# Patient Record
Sex: FEMALE | Race: WHITE | NOT HISPANIC OR LATINO | Employment: FULL TIME
[De-identification: names, ages, dates, MRNs, and addresses within clinical notes are randomized per-mention and may not be internally consistent; named-entity substitution may affect disease eponyms.]

---

## 2018-04-15 ENCOUNTER — HEALTH MAINTENANCE LETTER (OUTPATIENT)
Age: 43
End: 2018-04-15

## 2019-10-14 ENCOUNTER — TELEPHONE (OUTPATIENT)
Dept: PEDIATRICS | Facility: CLINIC | Age: 44
End: 2019-10-14

## 2019-10-14 DIAGNOSIS — R19.7 DIARRHEA, UNSPECIFIED TYPE: Primary | ICD-10-CM

## 2019-10-14 NOTE — TELEPHONE ENCOUNTER
Email:      Thanks. Tests for Jacinto and Warren definitely. Can you give ashlie and I as well or should we get from adult provider?  kristopher    On Mon, Oct 14, 2019 at 11:37 AM Annette Osullivan <[email or web address]> wrote:  Thanks for reaching out.  There is a local outbreak with crypto, so I would recommend testing for it for whoever is having intermittent diarrhea/cramping.  Alternatively would be just to treat- which is 3 days of anti-parasite medicine and pretty effective.  I would recommend testing though before treating.  Let me know who needs it and I can order the stool tests for you to .  If positive I will treat with the antiparasite med.  I will also screen for the other virus/bacteria panel as well.    Rebecca     From: Kristopher Butler [[email or web address]   Sent: Monday, October 14, 2019 8:08 AM  To: Annette Osullivan  Subject: crypto?     Kiran Fernandez-I hope you guys are good. I happened to sit next to Christina last night at the dinner and he mentioned [facility] being closed due to crypto. Two weeks ago we had Jacinto's birthday party at the [facility] and since then we have had intermittent diarrhea and some crampy abdominal pain in our house. I looked online and didn't see anything about the [facility] and crypto but given they are close and may have some overlap I am wondering if you think we should get tested or just treat? Thanks.  kristopher

## 2019-11-25 DIAGNOSIS — R05.9 COUGH: Primary | ICD-10-CM

## 2019-11-25 RX ORDER — AZITHROMYCIN 250 MG/1
TABLET, FILM COATED ORAL
Qty: 6 TABLET | Refills: 0 | Status: SHIPPED | OUTPATIENT
Start: 2019-11-25 | End: 2022-01-19

## 2020-02-26 ENCOUNTER — ANCILLARY PROCEDURE (OUTPATIENT)
Dept: MAMMOGRAPHY | Facility: CLINIC | Age: 45
End: 2020-02-26
Attending: OBSTETRICS & GYNECOLOGY
Payer: COMMERCIAL

## 2020-02-26 DIAGNOSIS — Z12.31 VISIT FOR SCREENING MAMMOGRAM: ICD-10-CM

## 2020-03-10 ENCOUNTER — HEALTH MAINTENANCE LETTER (OUTPATIENT)
Age: 45
End: 2020-03-10

## 2020-12-20 ENCOUNTER — HEALTH MAINTENANCE LETTER (OUTPATIENT)
Age: 45
End: 2020-12-20

## 2021-01-27 ENCOUNTER — TRANSFERRED RECORDS (OUTPATIENT)
Dept: HEALTH INFORMATION MANAGEMENT | Facility: CLINIC | Age: 46
End: 2021-01-27
Payer: COMMERCIAL

## 2021-03-05 DIAGNOSIS — Z12.31 VISIT FOR SCREENING MAMMOGRAM: ICD-10-CM

## 2021-03-05 PROCEDURE — 77067 SCR MAMMO BI INCL CAD: CPT | Mod: GC

## 2021-03-05 PROCEDURE — 77063 BREAST TOMOSYNTHESIS BI: CPT | Mod: GC

## 2021-04-24 ENCOUNTER — HEALTH MAINTENANCE LETTER (OUTPATIENT)
Age: 46
End: 2021-04-24

## 2021-10-03 ENCOUNTER — HEALTH MAINTENANCE LETTER (OUTPATIENT)
Age: 46
End: 2021-10-03

## 2021-12-02 ENCOUNTER — IMMUNIZATION (OUTPATIENT)
Dept: PEDIATRICS | Facility: CLINIC | Age: 46
End: 2021-12-02
Payer: COMMERCIAL

## 2021-12-02 PROCEDURE — 90471 IMMUNIZATION ADMIN: CPT

## 2021-12-02 PROCEDURE — 90686 IIV4 VACC NO PRSV 0.5 ML IM: CPT

## 2022-01-14 ENCOUNTER — ANCILLARY PROCEDURE (OUTPATIENT)
Dept: ULTRASOUND IMAGING | Facility: CLINIC | Age: 47
End: 2022-01-14
Attending: NURSE PRACTITIONER
Payer: COMMERCIAL

## 2022-01-14 DIAGNOSIS — N85.2 ENLARGED UTERUS: ICD-10-CM

## 2022-01-14 PROCEDURE — 76830 TRANSVAGINAL US NON-OB: CPT | Mod: GC | Performed by: RADIOLOGY

## 2022-01-14 PROCEDURE — 76856 US EXAM PELVIC COMPLETE: CPT | Mod: GC | Performed by: RADIOLOGY

## 2022-01-16 ENCOUNTER — HOSPITAL ENCOUNTER (EMERGENCY)
Facility: CLINIC | Age: 47
Discharge: HOME | End: 2022-01-16
Attending: EMERGENCY MEDICINE | Admitting: EMERGENCY MEDICINE
Payer: COMMERCIAL

## 2022-01-16 ENCOUNTER — APPOINTMENT (OUTPATIENT)
Dept: CT IMAGING | Facility: CLINIC | Age: 47
End: 2022-01-16
Attending: EMERGENCY MEDICINE
Payer: COMMERCIAL

## 2022-01-16 VITALS
RESPIRATION RATE: 14 BRPM | DIASTOLIC BLOOD PRESSURE: 89 MMHG | BODY MASS INDEX: 29.88 KG/M2 | HEART RATE: 90 BPM | OXYGEN SATURATION: 99 % | TEMPERATURE: 98.3 F | SYSTOLIC BLOOD PRESSURE: 128 MMHG | WEIGHT: 175 LBS | HEIGHT: 64 IN

## 2022-01-16 DIAGNOSIS — N20.1 URETERAL STONE: Primary | ICD-10-CM

## 2022-01-16 DIAGNOSIS — N20.1 URETERAL STONE: ICD-10-CM

## 2022-01-16 DIAGNOSIS — Q62.11 HYDRONEPHROSIS WITH URETEROPELVIC JUNCTION (UPJ) OBSTRUCTION: ICD-10-CM

## 2022-01-16 LAB
ALBUMIN SERPL-MCNC: 4.1 G/DL (ref 3.4–5)
ALBUMIN UR-MCNC: 30 MG/DL
ALP SERPL-CCNC: 62 U/L (ref 40–150)
ALT SERPL W P-5'-P-CCNC: 18 U/L (ref 0–50)
ANION GAP SERPL CALCULATED.3IONS-SCNC: 9 MMOL/L (ref 3–14)
APPEARANCE UR: ABNORMAL
AST SERPL W P-5'-P-CCNC: 15 U/L (ref 0–45)
BASOPHILS # BLD AUTO: 0.1 10E3/UL (ref 0–0.2)
BASOPHILS NFR BLD AUTO: 0 %
BILIRUB SERPL-MCNC: 1 MG/DL (ref 0.2–1.3)
BILIRUB UR QL STRIP: NEGATIVE
BUN SERPL-MCNC: 20 MG/DL (ref 7–30)
CALCIUM SERPL-MCNC: 9.2 MG/DL (ref 8.5–10.1)
CHLORIDE BLD-SCNC: 109 MMOL/L (ref 94–109)
CO2 SERPL-SCNC: 21 MMOL/L (ref 20–32)
COLOR UR AUTO: YELLOW
CREAT SERPL-MCNC: 1.12 MG/DL (ref 0.52–1.04)
EOSINOPHIL # BLD AUTO: 0 10E3/UL (ref 0–0.7)
EOSINOPHIL NFR BLD AUTO: 0 %
ERYTHROCYTE [DISTWIDTH] IN BLOOD BY AUTOMATED COUNT: 12.7 % (ref 10–15)
GFR SERPL CREATININE-BSD FRML MDRD: 61 ML/MIN/1.73M2
GLUCOSE BLD-MCNC: 116 MG/DL (ref 70–99)
GLUCOSE UR STRIP-MCNC: NEGATIVE MG/DL
HCT VFR BLD AUTO: 43.2 % (ref 35–47)
HGB BLD-MCNC: 14.6 G/DL (ref 11.7–15.7)
HGB UR QL STRIP: ABNORMAL
HOLD SPECIMEN: NORMAL
IMM GRANULOCYTES # BLD: 0 10E3/UL
IMM GRANULOCYTES NFR BLD: 0 %
KETONES UR STRIP-MCNC: 20 MG/DL
LEUKOCYTE ESTERASE UR QL STRIP: ABNORMAL
LIPASE SERPL-CCNC: 118 U/L (ref 73–393)
LYMPHOCYTES # BLD AUTO: 1.1 10E3/UL (ref 0.8–5.3)
LYMPHOCYTES NFR BLD AUTO: 10 %
MCH RBC QN AUTO: 29.1 PG (ref 26.5–33)
MCHC RBC AUTO-ENTMCNC: 33.8 G/DL (ref 31.5–36.5)
MCV RBC AUTO: 86 FL (ref 78–100)
MONOCYTES # BLD AUTO: 0.6 10E3/UL (ref 0–1.3)
MONOCYTES NFR BLD AUTO: 5 %
MUCOUS THREADS #/AREA URNS LPF: PRESENT /LPF
NEUTROPHILS # BLD AUTO: 9.7 10E3/UL (ref 1.6–8.3)
NEUTROPHILS NFR BLD AUTO: 85 %
NITRATE UR QL: NEGATIVE
NRBC # BLD AUTO: 0 10E3/UL
NRBC BLD AUTO-RTO: 0 /100
PH UR STRIP: 5.5 [PH] (ref 5–7)
PLATELET # BLD AUTO: 285 10E3/UL (ref 150–450)
POTASSIUM BLD-SCNC: 3.9 MMOL/L (ref 3.4–5.3)
PROT SERPL-MCNC: 7.6 G/DL (ref 6.8–8.8)
RADIOLOGIST FLAGS: ABNORMAL
RBC # BLD AUTO: 5.01 10E6/UL (ref 3.8–5.2)
RBC URINE: >182 /HPF
SARS-COV-2 RNA RESP QL NAA+PROBE: NEGATIVE
SODIUM SERPL-SCNC: 139 MMOL/L (ref 133–144)
SP GR UR STRIP: 1.03 (ref 1–1.03)
SQUAMOUS EPITHELIAL: 1 /HPF
TRANSITIONAL EPI: <1 /HPF
UROBILINOGEN UR STRIP-MCNC: NORMAL MG/DL
WBC # BLD AUTO: 11.5 10E3/UL (ref 4–11)
WBC URINE: 3 /HPF

## 2022-01-16 PROCEDURE — 258N000003 HC RX IP 258 OP 636: Performed by: EMERGENCY MEDICINE

## 2022-01-16 PROCEDURE — 74176 CT ABD & PELVIS W/O CONTRAST: CPT | Mod: 26 | Performed by: RADIOLOGY

## 2022-01-16 PROCEDURE — 36415 COLL VENOUS BLD VENIPUNCTURE: CPT | Performed by: EMERGENCY MEDICINE

## 2022-01-16 PROCEDURE — U0003 INFECTIOUS AGENT DETECTION BY NUCLEIC ACID (DNA OR RNA); SEVERE ACUTE RESPIRATORY SYNDROME CORONAVIRUS 2 (SARS-COV-2) (CORONAVIRUS DISEASE [COVID-19]), AMPLIFIED PROBE TECHNIQUE, MAKING USE OF HIGH THROUGHPUT TECHNOLOGIES AS DESCRIBED BY CMS-2020-01-R: HCPCS | Performed by: EMERGENCY MEDICINE

## 2022-01-16 PROCEDURE — 99207 PR NO BILLABLE SERVICE THIS VISIT: CPT | Performed by: STUDENT IN AN ORGANIZED HEALTH CARE EDUCATION/TRAINING PROGRAM

## 2022-01-16 PROCEDURE — 96374 THER/PROPH/DIAG INJ IV PUSH: CPT | Performed by: EMERGENCY MEDICINE

## 2022-01-16 PROCEDURE — 81001 URINALYSIS AUTO W/SCOPE: CPT | Performed by: EMERGENCY MEDICINE

## 2022-01-16 PROCEDURE — 99285 EMERGENCY DEPT VISIT HI MDM: CPT | Performed by: EMERGENCY MEDICINE

## 2022-01-16 PROCEDURE — 83690 ASSAY OF LIPASE: CPT | Performed by: EMERGENCY MEDICINE

## 2022-01-16 PROCEDURE — 99285 EMERGENCY DEPT VISIT HI MDM: CPT | Mod: 25 | Performed by: EMERGENCY MEDICINE

## 2022-01-16 PROCEDURE — 85025 COMPLETE CBC W/AUTO DIFF WBC: CPT | Performed by: EMERGENCY MEDICINE

## 2022-01-16 PROCEDURE — 80053 COMPREHEN METABOLIC PANEL: CPT | Performed by: EMERGENCY MEDICINE

## 2022-01-16 PROCEDURE — 74176 CT ABD & PELVIS W/O CONTRAST: CPT

## 2022-01-16 PROCEDURE — 250N000011 HC RX IP 250 OP 636: Performed by: EMERGENCY MEDICINE

## 2022-01-16 PROCEDURE — 96361 HYDRATE IV INFUSION ADD-ON: CPT | Performed by: EMERGENCY MEDICINE

## 2022-01-16 PROCEDURE — 96375 TX/PRO/DX INJ NEW DRUG ADDON: CPT | Performed by: EMERGENCY MEDICINE

## 2022-01-16 RX ORDER — TAMSULOSIN HYDROCHLORIDE 0.4 MG/1
0.4 CAPSULE ORAL DAILY
Qty: 10 CAPSULE | Refills: 0 | Status: SHIPPED | OUTPATIENT
Start: 2022-01-16 | End: 2022-01-26

## 2022-01-16 RX ORDER — ONDANSETRON 2 MG/ML
4 INJECTION INTRAMUSCULAR; INTRAVENOUS ONCE
Status: COMPLETED | OUTPATIENT
Start: 2022-01-16 | End: 2022-01-16

## 2022-01-16 RX ORDER — KETOROLAC TROMETHAMINE 30 MG/ML
30 INJECTION, SOLUTION INTRAMUSCULAR; INTRAVENOUS ONCE
Status: COMPLETED | OUTPATIENT
Start: 2022-01-16 | End: 2022-01-16

## 2022-01-16 RX ADMIN — ONDANSETRON 4 MG: 2 INJECTION INTRAMUSCULAR; INTRAVENOUS at 12:05

## 2022-01-16 RX ADMIN — SODIUM CHLORIDE 1000 ML: 9 INJECTION, SOLUTION INTRAVENOUS at 12:22

## 2022-01-16 RX ADMIN — KETOROLAC TROMETHAMINE 30 MG: 30 INJECTION, SOLUTION INTRAMUSCULAR at 12:05

## 2022-01-16 ASSESSMENT — MIFFLIN-ST. JEOR: SCORE: 1418.79

## 2022-01-16 ASSESSMENT — ENCOUNTER SYMPTOMS
NAUSEA: 1
ABDOMINAL PAIN: 1
VOMITING: 1
BACK PAIN: 1
SHORTNESS OF BREATH: 0
FEVER: 0
DYSURIA: 1

## 2022-01-16 NOTE — H&P (VIEW-ONLY)
Urology Consult, History and Physical    Name: Belkis Butler    MRN: [id number]   YOB: 1975               Chief Complaint:   Obstructing left ureteral stone    History is obtained from the patient and chart review          History of Present Illness:   Belkis Butler is a 46 year old female with PMH of PCOS who presents with feft flank and abdominal pain for the past 7 hours. Patient says she may have spontaneously passed kidney stone many years ago but has never had surgery for nephrolithiasis. Left sided abdominal pain started a few weeks ago. Also has had some urinary frequency over this time. Had a pelvic US as well as recent urine studies. Her pain was 8/10 this morning.    Patient endorses associated: nausea/vomiting this morning, urinary frequency    Patient denies any: gross hematuria, subjective fevers/chills, dysuria    She last ate at 8 pm yesterday. She last drank coffee with cream at 8 am but vomited after then drank water with miralax around 10am. She is not on blood thinners.    In ER patient has HTN (151/107) but o/w normal vital signs. Work up shows mild leukocytosis (11.5), and elevation of her Cr at 1.12 (only prior value is 0.83 in 2016). UA is pending.    She pulled up outside lab records and allowed me to review them. She had a UA on 1/13 which was negative for signs of infection, Urine culture from that date is pending. BMP showed Cr 0.92 on 1/14.    Currently, patient's pain is well controlled. She was given tordaol 30 mg once in ER and now pain rated as 3-4/10. In the ER her workup was notable for the following lab results:    Recent Labs   Lab 01/16/22  1201   WBC 11.5*       Recent Labs   Lab 01/16/22  1201   CR 1.12*     UA 1/16/22  Color Urine Colorless, Straw, Light Yellow, Yellow Yellow      Appearance Urine Clear Slightly Cloudy Abnormal      Glucose Urine Negative mg/dL Negative     Bilirubin Urine Negative Negative     Ketones Urine Negative mg/dL 20  Abnormal       Specific Gravity Urine 1.003 - 1.035 1.033     Blood Urine Negative Large Abnormal      pH Urine 5.0 - 7.0 5.5     Protein Albumin Urine Negative mg/dL 30  Abnormal      Urobilinogen Urine Normal, 2.0 mg/dL Normal     Nitrite Urine Negative Negative     Leukocyte Esterase Urine Negative Small Abnormal      Mucus Urine None Seen /LPF Present Abnormal      RBC Urine <=2 /HPF >182 High      WBC Urine <=5 /HPF 3     Squamous Epithelials Urine <=1 /HPF 1     Transitional Epithelials Urine <=1 /HPF <1               Past Medical History:     Past Medical History:   Diagnosis Date     PCOS (polycystic ovarian syndrome)             Past Surgical History:     Past Surgical History:   Procedure Laterality Date     D & C              Social History:     Social History     Tobacco Use     Smoking status: Never Smoker     Smokeless tobacco: Never Used   Substance Use Topics     Alcohol use: No            Family History:     Family History   Problem Relation Age of Onset     Asthma Sister      Allergies Mother      Allergies Sister      Cancer Paternal Grandfather         lung     Ovarian Cancer Paternal Grandmother      Cancer Maternal Grandmother         leukemia     Heart Disease Maternal Grandmother      Heart Disease Maternal Grandfather             Allergies:     Allergies   Allergen Reactions     Penicillins             Medications:     No current facility-administered medications for this encounter.     Current Outpatient Medications   Medication Sig     tamsulosin (FLOMAX) 0.4 MG capsule Take 1 capsule (0.4 mg) by mouth daily for 10 doses     azithromycin (ZITHROMAX) 250 MG tablet Take 2 tabs (500 mg) PO on day 1 and 1 tab (250 mg) PO days 2-5     cetirizine (ZYRTEC) 10 MG tablet Take 10 mg by mouth daily     multivitamin, therapeutic (THERA-VIT) TABS Take 1 tablet by mouth daily             Review of Systems:   10 point ROS negative unless otherwise specified in HPI          Physical Exam:   VS:  T: 98.3    HR: 69     BP: 132/88    RR: 18   GEN:  AOx3.  NAD.  Pleasant.  HEENT:  Sclerae anicteric.  Conjunctivae pink.  MMM.  NECK:  Supple.  No LAD.  CV: Warm, well perfused  LUNGS: Non-labored breathing.  BACK:  No midline or CVA tenderness.  ABD:  Soft.  NT.  ND.  No rebound or guarding.  No masses.  EXT:  No edema.  SKIN:  Warm.  Dry.  No rashes.  NEURO:  CN grossly intact.          Data:   All laboratory data reviewed and highlighted above:    All pertinent imaging reviewed:  CT scan of the abdomen - 1/16/22:   FINDINGS:     Abdomen and pelvis:  9 mm obstructing stone in the distal left ureter near the UVJ with  upstream hydroureter and mild hydronephrosis. No right-sided urinary  tract stones or right-sided hydroureter or hydronephrosis.  Unremarkable noncontrast appearance of the kidneys. Urinary bladder is  decompressed.     Unremarkable noncontrast appearance of the liver, gallbladder, spleen,  adrenal glands and pancreas. Small left upper quadrant splenule. The  small and large bowel is normal in caliber without evidence of a bowel  obstruction. Appendix is normal. No intra-abdominal free air or free  fluid. No abdominal aortic aneurysm.     Lung bases: Lung bases are clear. Heart size is normal. No pericardial  effusion.     Bones and soft tissues: No acute or suspicious osseous lesion.  Multilevel thoracic vertebral body hemangiomas. Soft tissues are  unremarkable.                                                                      IMPRESSION: Obstructing 9 mm stone in the distal left ureter near the  UVJ. Associated hydroureter and mild hydronephrosis.            Impression and Plan:   Impression:   Belkis Butler is a 46 year old female with obstructing distal left UVJ 9 mm ureteral stone, no signs of infection, renal function WNL, pain improved with medications. Discussed management options and patient favors proceeding with surgery this week unless she passes stone.      Plan:   -COVID PCR test in ER now  -Ok  to discharge with pain control: Scheduled ibuprofen (600 mg q6hrs) and tylenol (650 mg q4hrs) as well as dramamine at night. Narcotics at discretion of ER provider.  -Start tamsulosin 0.4 mg daily.  -She will be called Monday or Tuesday to schedule surgery on Wednesday, 1/19/22, at [facility] with Dr. Abebe.  -Please provide a urine strainer on discharge       Discussed with staff, Dr. Abebe.    Cornelio Arreguin MD  Urology Resident

## 2022-01-16 NOTE — DISCHARGE INSTRUCTIONS
Please make an appointment to follow up with Urology Clinic (phone: [phone]) on Wednesday January 19 for stone removal.  Take Tylenol 650 mg every 4 hours alternating with ibuprofen 600 mg every 6 hours with food.  You may use Dramamine at night to help you sleep.  Strain your urine.  Take Flomax to help expel the stone.  Return for concerns

## 2022-01-16 NOTE — ED PROVIDER NOTES
[facility] EMERGENCY DEPARTMENT ([facility])  1/16/22    History     Chief Complaint   Patient presents with     Abdominal Pain     Nausea & Vomiting     The history is provided by the patient and medical records.     Belkis Butler is a 46 year old female with a history of PCOS who presents to the Emergency Department with back pain and abdominal pain. Patient reports ongoing mid back pain and intermittent abdominal pain. She states she woke up this morning with excruciating abdominal pain. She tried changing positions and taking a bath without relief. She reported 1 episode of vomiting this morning. No fevers. Patient had been camping with her  at [facility] this morning. Patient reports she has been having urinary frequency and tingling with urination. She has been receiving testing for ongoing symptoms, states urine culture from Friday, 1/14, has not returned although there was trace blood seen on UA. She also had a pelvic ultrasound and there was noted swelling of the uterus. Patient denies chest pain or shortness of breath. Patient had a negative pregnancy test on Friday. She is fully vaccinated against COVID and reports receiving a booster, however, this cannot be confirmed in [facility].     Social: Patient is here with her ; he is a pediatric oncologist.    Past Medical History  Past Medical History:   Diagnosis Date     PCOS (polycystic ovarian syndrome)      Past Surgical History:   Procedure Laterality Date     D & C       tamsulosin (FLOMAX) 0.4 MG capsule  azithromycin (ZITHROMAX) 250 MG tablet  cetirizine (ZYRTEC) 10 MG tablet  multivitamin, therapeutic (THERA-VIT) TABS      Allergies   Allergen Reactions     Penicillins      Family History  Family History   Problem Relation Age of Onset     Asthma Sister      Allergies Mother      Allergies Sister      Cancer Paternal Grandfather         lung     Ovarian Cancer Paternal Grandmother      Cancer Maternal Grandmother       "   leukemia     Heart Disease Maternal Grandmother      Heart Disease Maternal Grandfather      Social History   Social History     Tobacco Use     Smoking status: Never Smoker     Smokeless tobacco: Never Used   Substance Use Topics     Alcohol use: No     Drug use: No      Past medical history, past surgical history, medications, allergies, family history, and social history were reviewed with the patient. No additional pertinent items.       Review of Systems   Constitutional: Negative for fever.   Respiratory: Negative for shortness of breath.    Cardiovascular: Negative for chest pain.   Gastrointestinal: Positive for abdominal pain, nausea and vomiting.   Genitourinary: Positive for dysuria and urgency.   Musculoskeletal: Positive for back pain.   All other systems reviewed and are negative.        Physical Exam   BP: (!) 151/107  Pulse: 79  Temp: 98.3  F (36.8  C)  Resp: 18  Height: 162.6 cm (5' 4\")  Weight: 79.4 kg (175 lb)  SpO2: 97 %  Physical Exam  Physical Exam   Constitutional:   well nourished, well developed, appears uncomfortable  HENT:   Head: Normocephalic and atraumatic.   Eyes: Conjunctivae are normal. Pupils are equal, round, and reactive to light.    pharynx has no erythema or exudate, mucous membranes are moist  Neck:   no adenopathy, no bony tenderness  Cardiovascular: regular rate and rhythm without murmurs or gallops  Pulmonary/Chest: Clear to auscultation bilaterally, with no wheezes or retractions. No respiratory distress.  GI: Soft with good bowel sounds.  Non-tender, non-distended, with no guarding, no rebound, no peritoneal signs.   Back: Left flank tenderness, no bony or CVA tenderness   Musculoskeletal:  no edema  Skin: Skin is warm and dry.   Neurological: alert and oriented to person, place, and time. Nonfocal exam  Psychiatric:  normal mood and affect.    ED Course   11:52 AM  The patient was seen and examined by Danielle Elias MD in Room ED04.      Procedures       The medical " record was reviewed and interpreted.  Current labs reviewed and interpreted.  Current images reviewed and interpreted: see below.  Managed outpatient prescription medications.              Results for orders placed or performed during the hospital encounter of 01/16/22   CT Abdomen Pelvis w/o Contrast     Status: Abnormal   Result Value Ref Range    Radiologist flags Obstructing left ureteral stone. (Urgent)     Narrative    EXAMINATION: CT ABDOMEN PELVIS W/O CONTRAST, 1/16/2022 12:21 PM    TECHNIQUE:  Helical CT images from the lung bases through the  symphysis pubis were obtained without IV contrast.     COMPARISON: Flank pain, kidney stone suspected    HISTORY: Flank pain, kidney stone suspected    FINDINGS:    Abdomen and pelvis:  9 mm obstructing stone in the distal left ureter near the UVJ with  upstream hydroureter and mild hydronephrosis. No right-sided urinary  tract stones or right-sided hydroureter or hydronephrosis.  Unremarkable noncontrast appearance of the kidneys. Urinary bladder is  decompressed.    Unremarkable noncontrast appearance of the liver, gallbladder, spleen,  adrenal glands and pancreas. Small left upper quadrant splenule. The  small and large bowel is normal in caliber without evidence of a bowel  obstruction. Appendix is normal. No intra-abdominal free air or free  fluid. No abdominal aortic aneurysm.    Lung bases: Lung bases are clear. Heart size is normal. No pericardial  effusion.    Bones and soft tissues: No acute or suspicious osseous lesion.  Multilevel thoracic vertebral body hemangiomas. Soft tissues are  unremarkable.      Impression    IMPRESSION: Obstructing 9 mm stone in the distal left ureter near the  UVJ. Associated hydroureter and mild hydronephrosis.    [Urgent Result: Obstructing left ureteral stone.    Finding was identified on 1/16/2022 12:27 PM.     Dr. Elias was contacted by Dr. Mejia at 1/16/2022 12:32 PM and  verbalized understanding of the urgent  finding.      I have personally reviewed the examination and initial interpretation  and I agree with the findings.    JEANNE REDD MD         SYSTEM ID:  [id number]   Comprehensive metabolic panel     Status: Abnormal   Result Value Ref Range    Sodium 139 133 - 144 mmol/L    Potassium 3.9 3.4 - 5.3 mmol/L    Chloride 109 94 - 109 mmol/L    Carbon Dioxide (CO2) 21 20 - 32 mmol/L    Anion Gap 9 3 - 14 mmol/L    Urea Nitrogen 20 7 - 30 mg/dL    Creatinine 1.12 (H) 0.52 - 1.04 mg/dL    Calcium 9.2 8.5 - 10.1 mg/dL    Glucose 116 (H) 70 - 99 mg/dL    Alkaline Phosphatase 62 40 - 150 U/L    AST 15 0 - 45 U/L    ALT 18 0 - 50 U/L    Protein Total 7.6 6.8 - 8.8 g/dL    Albumin 4.1 3.4 - 5.0 g/dL    Bilirubin Total 1.0 0.2 - 1.3 mg/dL    GFR Estimate 61 >60 mL/min/1.73m2   Lipase     Status: Normal   Result Value Ref Range    Lipase 118 73 - 393 U/L   CBC with platelets and differential     Status: Abnormal   Result Value Ref Range    WBC Count 11.5 (H) 4.0 - 11.0 10e3/uL    RBC Count 5.01 3.80 - 5.20 10e6/uL    Hemoglobin 14.6 11.7 - 15.7 g/dL    Hematocrit 43.2 35.0 - 47.0 %    MCV 86 78 - 100 fL    MCH 29.1 26.5 - 33.0 pg    MCHC 33.8 31.5 - 36.5 g/dL    RDW 12.7 10.0 - 15.0 %    Platelet Count 285 150 - 450 10e3/uL    % Neutrophils 85 %    % Lymphocytes 10 %    % Monocytes 5 %    % Eosinophils 0 %    % Basophils 0 %    % Immature Granulocytes 0 %    NRBCs per 100 WBC 0 <1 /100    Absolute Neutrophils 9.7 (H) 1.6 - 8.3 10e3/uL    Absolute Lymphocytes 1.1 0.8 - 5.3 10e3/uL    Absolute Monocytes 0.6 0.0 - 1.3 10e3/uL    Absolute Eosinophils 0.0 0.0 - 0.7 10e3/uL    Absolute Basophils 0.1 0.0 - 0.2 10e3/uL    Absolute Immature Granulocytes 0.0 <=0.4 10e3/uL    Absolute NRBCs 0.0 10e3/uL   Extra Blue Top Tube     Status: None   Result Value Ref Range    Hold Specimen JIC    Extra Red Top Tube     Status: None   Result Value Ref Range    Hold Specimen JIC    Extra Green Top (Lithium Heparin) Tube     Status: None   Result  Value Ref Range    Hold Specimen JI    Extra Purple Top Tube     Status: None   Result Value Ref Range    Hold Specimen JI    UA with Microscopic reflex to Culture     Status: Abnormal    Specimen: Urine, Clean Catch   Result Value Ref Range    Color Urine Yellow Colorless, Straw, Light Yellow, Yellow    Appearance Urine Slightly Cloudy (A) Clear    Glucose Urine Negative Negative mg/dL    Bilirubin Urine Negative Negative    Ketones Urine 20  (A) Negative mg/dL    Specific Gravity Urine 1.033 1.003 - 1.035    Blood Urine Large (A) Negative    pH Urine 5.5 5.0 - 7.0    Protein Albumin Urine 30  (A) Negative mg/dL    Urobilinogen Urine Normal Normal, 2.0 mg/dL    Nitrite Urine Negative Negative    Leukocyte Esterase Urine Small (A) Negative    Mucus Urine Present (A) None Seen /LPF    RBC Urine >182 (H) <=2 /HPF    WBC Urine 3 <=5 /HPF    Squamous Epithelials Urine 1 <=1 /HPF    Transitional Epithelials Urine <1 <=1 /HPF    Narrative    Urine Culture not indicated   CBC with platelets differential     Status: Abnormal    Narrative    The following orders were created for panel order CBC with platelets differential.  Procedure                               Abnormality         Status                     ---------                               -----------         ------                     CBC with platelets and d...[[id number]]  Abnormal            Final result                 Please view results for these tests on the individual orders.   [place] Draw     Status: None    Narrative    The following orders were created for panel order [place] Draw.  Procedure                               Abnormality         Status                     ---------                               -----------         ------                     Extra Blue Top Tube[[id number]]                              Final result               Extra Red Top Tube[[id number]]                               Final result               Extra Green Top  "(Lithium...[[id number]]                      Final result               Extra Purple Top Tube[[id number]]                            Final result                 Please view results for these tests on the individual orders.     Medications   0.9% sodium chloride BOLUS (1,000 mLs Intravenous New Bag 1/16/22 1222)   ketorolac (TORADOL) injection 30 mg (30 mg Intravenous Given 1/16/22 1205)   ondansetron (ZOFRAN) injection 4 mg (4 mg Intravenous Given 1/16/22 1205)        Assessments & Plan (with Medical Decision Making)       I have reviewed the nursing notes.   Emergency Department course:  The patient was seen and examined at 1152 am.  An IV was started and I treated the patient with a normal saline bolus IV, Toradol IV, and Zofran IV.    Laboratory studies show a slightly elevated creatinine of 1.12.  GFR is 61.  Glucose is slightly high at 116.  CBC shows leukocytosis, with a WBC of 11.5.  Lipase is normal at 118.  UA shows 20 ketones and greater than 182 RBCs.  This does not appear to be an infected sample.  hCG is negative (from a lab test yesterday)    CT of the abdomen and pelvis done without contrast shows IMPRESSION: Obstructing 9 mm stone in the distal left ureter near the  UVJ. Associated hydroureter and mild hydronephrosis.    I consulted Urology, and Dr. Arreguin evaluated the patient in the emergency department.  Please see his consult note for details  Urology recommendations are discharged home with a urine strainer and Flomax.  She should alternate Tylenol 650 mg every 4 hours with ibuprofen, 600 mg every 6 hours with food for pain.  She has Dramamine at home that can help her with sleep.  The urology clinic has a \"stone day\" on Wednesday and plans are to have her undergo surgery for the stone on that day.  He did ask that she be swabbed for COVID prior to discharge    I discussed these recommendations with the patient.  I also discussed reasons to return to the emergency department.  She will should " follow-up in the urology clinic on Wednesday for surgery.  The clinic will contact her with details for follow up.      I have reviewed the findings, diagnosis, plan and need for follow up with the patient.    New Prescriptions    TAMSULOSIN (FLOMAX) 0.4 MG CAPSULE    Take 1 capsule (0.4 mg) by mouth daily for 10 doses       Final diagnoses:   Ureteral stone   Hydronephrosis with ureteropelvic junction (UPJ) obstruction     IMarilee , am serving as a trained medical scribe to document services personally performed by Danielle Elias MD, based on the provider's statements to me.      IDanielle MD, was physically present and have reviewed and verified the accuracy of this note documented by Marilee Birmingham.     This note was created in part by the use of Dragon voice recognition dictation system. Inadvertent grammatical errors and typographical errors may still exist.  Danielle Elias MD    --  Danielle Elias MD  [facility] EMERGENCY DEPARTMENT  1/16/2022     Danielle Elias MD  01/16/22 2991

## 2022-01-16 NOTE — CONSULTS
Urology Consult, History and Physical    Name: Belkis Butler    MRN: [id number]   YOB: 1975               Chief Complaint:   Obstructing left ureteral stone    History is obtained from the patient and chart review          History of Present Illness:   Belkis Butler is a 46 year old female with PMH of PCOS who presents with feft flank and abdominal pain for the past 7 hours. Patient says she may have spontaneously passed kidney stone many years ago but has never had surgery for nephrolithiasis. Left sided abdominal pain started a few weeks ago. Also has had some urinary frequency over this time. Had a pelvic US as well as recent urine studies. Her pain was 8/10 this morning.    Patient endorses associated: nausea/vomiting this morning, urinary frequency    Patient denies any: gross hematuria, subjective fevers/chills, dysuria    She last ate at 8 pm yesterday. She last drank coffee with cream at 8 am but vomited after then drank water with miralax around 10am. She is not on blood thinners.    In ER patient has HTN (151/107) but o/w normal vital signs. Work up shows mild leukocytosis (11.5), and elevation of her Cr at 1.12 (only prior value is 0.83 in 2016). UA is pending.    She pulled up outside lab records and allowed me to review them. She had a UA on 1/13 which was negative for signs of infection, Urine culture from that date is pending. BMP showed Cr 0.92 on 1/14.    Currently, patient's pain is well controlled. She was given tordaol 30 mg once in ER and now pain rated as 3-4/10. In the ER her workup was notable for the following lab results:    Recent Labs   Lab 01/16/22  1201   WBC 11.5*       Recent Labs   Lab 01/16/22  1201   CR 1.12*     UA 1/16/22  Color Urine Colorless, Straw, Light Yellow, Yellow Yellow      Appearance Urine Clear Slightly Cloudy Abnormal      Glucose Urine Negative mg/dL Negative     Bilirubin Urine Negative Negative     Ketones Urine Negative mg/dL 20  Abnormal       Specific Gravity Urine 1.003 - 1.035 1.033     Blood Urine Negative Large Abnormal      pH Urine 5.0 - 7.0 5.5     Protein Albumin Urine Negative mg/dL 30  Abnormal      Urobilinogen Urine Normal, 2.0 mg/dL Normal     Nitrite Urine Negative Negative     Leukocyte Esterase Urine Negative Small Abnormal      Mucus Urine None Seen /LPF Present Abnormal      RBC Urine <=2 /HPF >182 High      WBC Urine <=5 /HPF 3     Squamous Epithelials Urine <=1 /HPF 1     Transitional Epithelials Urine <=1 /HPF <1               Past Medical History:     Past Medical History:   Diagnosis Date     PCOS (polycystic ovarian syndrome)             Past Surgical History:     Past Surgical History:   Procedure Laterality Date     D & C              Social History:     Social History     Tobacco Use     Smoking status: Never Smoker     Smokeless tobacco: Never Used   Substance Use Topics     Alcohol use: No            Family History:     Family History   Problem Relation Age of Onset     Asthma Sister      Allergies Mother      Allergies Sister      Cancer Paternal Grandfather         lung     Ovarian Cancer Paternal Grandmother      Cancer Maternal Grandmother         leukemia     Heart Disease Maternal Grandmother      Heart Disease Maternal Grandfather             Allergies:     Allergies   Allergen Reactions     Penicillins             Medications:     No current facility-administered medications for this encounter.     Current Outpatient Medications   Medication Sig     tamsulosin (FLOMAX) 0.4 MG capsule Take 1 capsule (0.4 mg) by mouth daily for 10 doses     azithromycin (ZITHROMAX) 250 MG tablet Take 2 tabs (500 mg) PO on day 1 and 1 tab (250 mg) PO days 2-5     cetirizine (ZYRTEC) 10 MG tablet Take 10 mg by mouth daily     multivitamin, therapeutic (THERA-VIT) TABS Take 1 tablet by mouth daily             Review of Systems:   10 point ROS negative unless otherwise specified in HPI          Physical Exam:   VS:  T: 98.3    HR: 69     BP: 132/88    RR: 18   GEN:  AOx3.  NAD.  Pleasant.  HEENT:  Sclerae anicteric.  Conjunctivae pink.  MMM.  NECK:  Supple.  No LAD.  CV: Warm, well perfused  LUNGS: Non-labored breathing.  BACK:  No midline or CVA tenderness.  ABD:  Soft.  NT.  ND.  No rebound or guarding.  No masses.  EXT:  No edema.  SKIN:  Warm.  Dry.  No rashes.  NEURO:  CN grossly intact.          Data:   All laboratory data reviewed and highlighted above:    All pertinent imaging reviewed:  CT scan of the abdomen - 1/16/22:   FINDINGS:     Abdomen and pelvis:  9 mm obstructing stone in the distal left ureter near the UVJ with  upstream hydroureter and mild hydronephrosis. No right-sided urinary  tract stones or right-sided hydroureter or hydronephrosis.  Unremarkable noncontrast appearance of the kidneys. Urinary bladder is  decompressed.     Unremarkable noncontrast appearance of the liver, gallbladder, spleen,  adrenal glands and pancreas. Small left upper quadrant splenule. The  small and large bowel is normal in caliber without evidence of a bowel  obstruction. Appendix is normal. No intra-abdominal free air or free  fluid. No abdominal aortic aneurysm.     Lung bases: Lung bases are clear. Heart size is normal. No pericardial  effusion.     Bones and soft tissues: No acute or suspicious osseous lesion.  Multilevel thoracic vertebral body hemangiomas. Soft tissues are  unremarkable.                                                                      IMPRESSION: Obstructing 9 mm stone in the distal left ureter near the  UVJ. Associated hydroureter and mild hydronephrosis.            Impression and Plan:   Impression:   Belkis Butler is a 46 year old female with obstructing distal left UVJ 9 mm ureteral stone, no signs of infection, renal function WNL, pain improved with medications. Discussed management options and patient favors proceeding with surgery this week unless she passes stone.      Plan:   -COVID PCR test in ER now  -Ok  to discharge with pain control: Scheduled ibuprofen (600 mg q6hrs) and tylenol (650 mg q4hrs) as well as dramamine at night. Narcotics at discretion of ER provider.  -Start tamsulosin 0.4 mg daily.  -She will be called Monday or Tuesday to schedule surgery on Wednesday, 1/19/22, at [facility] with Dr. Abebe.  -Please provide a urine strainer on discharge       Discussed with staff, Dr. Abebe.    Cornelio Arreguin MD  Urology Resident

## 2022-01-16 NOTE — ED TRIAGE NOTES
Pt arrives ambulatory with spouse - pt was camping with  and started having back and abdominal pain this AM. Pt states she has also has had blood in her urine. She was being tested for UTI though primary care but results are not back yet. Afebrile.

## 2022-01-17 ENCOUNTER — TELEPHONE (OUTPATIENT)
Dept: UROLOGY | Facility: CLINIC | Age: 47
End: 2022-01-17
Payer: COMMERCIAL

## 2022-01-17 PROBLEM — N20.1 URETERAL STONE: Status: ACTIVE | Noted: 2022-01-17

## 2022-01-17 NOTE — TELEPHONE ENCOUNTER
Patient is scheduled for surgery with Dr. Abebe     Spoke with: Patient via phone     Date of Surgery: Wednesday January 19, 2022     Location: [facility] OR      Informed patient they will need an adult : Yes     Pre-op: yes     H&P: Patient went to ED 1/16/22      Pre-procedure COVID-19 Test: completed 1/16/22     Post-op: TBD    Additional imaging/appointments: N/A     Surgery packet: N/A    Additional comments: N/A

## 2022-01-18 ENCOUNTER — ANESTHESIA EVENT (OUTPATIENT)
Dept: SURGERY | Facility: AMBULATORY SURGERY CENTER | Age: 47
End: 2022-01-18
Payer: COMMERCIAL

## 2022-01-18 RX ORDER — MEPERIDINE HYDROCHLORIDE 25 MG/ML
12.5 INJECTION INTRAMUSCULAR; INTRAVENOUS; SUBCUTANEOUS
Status: CANCELLED | OUTPATIENT
Start: 2022-01-18

## 2022-01-19 ENCOUNTER — HOSPITAL ENCOUNTER (OUTPATIENT)
Facility: AMBULATORY SURGERY CENTER | Age: 47
End: 2022-01-19
Attending: UROLOGY
Payer: COMMERCIAL

## 2022-01-19 ENCOUNTER — ANESTHESIA (OUTPATIENT)
Dept: SURGERY | Facility: AMBULATORY SURGERY CENTER | Age: 47
End: 2022-01-19
Payer: COMMERCIAL

## 2022-01-19 ENCOUNTER — ANCILLARY PROCEDURE (OUTPATIENT)
Dept: RADIOLOGY | Facility: AMBULATORY SURGERY CENTER | Age: 47
End: 2022-01-19
Attending: UROLOGY
Payer: COMMERCIAL

## 2022-01-19 VITALS
HEART RATE: 55 BPM | DIASTOLIC BLOOD PRESSURE: 86 MMHG | BODY MASS INDEX: 30.01 KG/M2 | OXYGEN SATURATION: 99 % | RESPIRATION RATE: 16 BRPM | WEIGHT: 174.82 LBS | TEMPERATURE: 97 F | SYSTOLIC BLOOD PRESSURE: 129 MMHG

## 2022-01-19 DIAGNOSIS — R39.89 URINARY PROBLEM: ICD-10-CM

## 2022-01-19 DIAGNOSIS — N20.1 URETERAL STONE: ICD-10-CM

## 2022-01-19 DIAGNOSIS — N20.1 URETERAL STONE: Primary | ICD-10-CM

## 2022-01-19 LAB
HCG UR QL: NEGATIVE
INTERNAL QC OK POCT: NORMAL
POCT KIT EXPIRATION DATE: NORMAL
POCT KIT LOT NUMBER: NORMAL

## 2022-01-19 PROCEDURE — 52356 CYSTO/URETERO W/LITHOTRIPSY: CPT | Mod: LT

## 2022-01-19 PROCEDURE — 74420 UROGRAPHY RTRGR +-KUB: CPT | Mod: TC

## 2022-01-19 PROCEDURE — 74420 UROGRAPHY RTRGR +-KUB: CPT | Mod: 26 | Performed by: UROLOGY

## 2022-01-19 PROCEDURE — 52356 CYSTO/URETERO W/LITHOTRIPSY: CPT | Mod: LT | Performed by: UROLOGY

## 2022-01-19 DEVICE — STENT URETERAL PERCUFLEX PLUS 6FRX24CM M0061752620: Type: IMPLANTABLE DEVICE | Site: URETER | Status: FUNCTIONAL

## 2022-01-19 RX ORDER — ALBUTEROL SULFATE 0.83 MG/ML
2.5 SOLUTION RESPIRATORY (INHALATION) EVERY 4 HOURS PRN
Status: DISCONTINUED | OUTPATIENT
Start: 2022-01-19 | End: 2022-01-19 | Stop reason: HOSPADM

## 2022-01-19 RX ORDER — ONDANSETRON 4 MG/1
4 TABLET, ORALLY DISINTEGRATING ORAL EVERY 30 MIN PRN
Status: DISCONTINUED | OUTPATIENT
Start: 2022-01-19 | End: 2022-01-20 | Stop reason: HOSPADM

## 2022-01-19 RX ORDER — IBUPROFEN 200 MG
400 TABLET ORAL EVERY 4 HOURS PRN
COMMUNITY

## 2022-01-19 RX ORDER — CEFAZOLIN SODIUM 2 G/50ML
2 SOLUTION INTRAVENOUS SEE ADMIN INSTRUCTIONS
Status: DISCONTINUED | OUTPATIENT
Start: 2022-01-19 | End: 2022-01-19 | Stop reason: HOSPADM

## 2022-01-19 RX ORDER — OXYCODONE HYDROCHLORIDE 5 MG/1
5 TABLET ORAL EVERY 6 HOURS PRN
Status: SHIPPED | OUTPATIENT
Start: 2022-01-19

## 2022-01-19 RX ORDER — GABAPENTIN 300 MG/1
300 CAPSULE ORAL
Status: COMPLETED | OUTPATIENT
Start: 2022-01-19 | End: 2022-01-19

## 2022-01-19 RX ORDER — ACETAMINOPHEN 325 MG/1
975 TABLET ORAL ONCE
Status: COMPLETED | OUTPATIENT
Start: 2022-01-19 | End: 2022-01-19

## 2022-01-19 RX ORDER — CEFAZOLIN SODIUM 2 G/50ML
2 SOLUTION INTRAVENOUS
Status: COMPLETED | OUTPATIENT
Start: 2022-01-19 | End: 2022-01-19

## 2022-01-19 RX ORDER — DEXAMETHASONE SODIUM PHOSPHATE 4 MG/ML
INJECTION, SOLUTION INTRA-ARTICULAR; INTRALESIONAL; INTRAMUSCULAR; INTRAVENOUS; SOFT TISSUE PRN
Status: DISCONTINUED | OUTPATIENT
Start: 2022-01-19 | End: 2022-01-19

## 2022-01-19 RX ORDER — HYDROMORPHONE HYDROCHLORIDE 1 MG/ML
0.4 INJECTION, SOLUTION INTRAMUSCULAR; INTRAVENOUS; SUBCUTANEOUS EVERY 10 MIN PRN
Status: DISCONTINUED | OUTPATIENT
Start: 2022-01-19 | End: 2022-01-19 | Stop reason: HOSPADM

## 2022-01-19 RX ORDER — SODIUM CHLORIDE, SODIUM LACTATE, POTASSIUM CHLORIDE, CALCIUM CHLORIDE 600; 310; 30; 20 MG/100ML; MG/100ML; MG/100ML; MG/100ML
INJECTION, SOLUTION INTRAVENOUS CONTINUOUS
Status: DISCONTINUED | OUTPATIENT
Start: 2022-01-19 | End: 2022-01-19 | Stop reason: HOSPADM

## 2022-01-19 RX ORDER — ONDANSETRON 2 MG/ML
INJECTION INTRAMUSCULAR; INTRAVENOUS PRN
Status: DISCONTINUED | OUTPATIENT
Start: 2022-01-19 | End: 2022-01-19

## 2022-01-19 RX ORDER — HALOPERIDOL 5 MG/ML
1 INJECTION INTRAMUSCULAR
Status: DISCONTINUED | OUTPATIENT
Start: 2022-01-19 | End: 2022-01-20 | Stop reason: HOSPADM

## 2022-01-19 RX ORDER — FENTANYL CITRATE 50 UG/ML
50 INJECTION, SOLUTION INTRAMUSCULAR; INTRAVENOUS EVERY 5 MIN PRN
Status: DISCONTINUED | OUTPATIENT
Start: 2022-01-19 | End: 2022-01-19 | Stop reason: HOSPADM

## 2022-01-19 RX ORDER — PROPOFOL 10 MG/ML
INJECTION, EMULSION INTRAVENOUS PRN
Status: DISCONTINUED | OUTPATIENT
Start: 2022-01-19 | End: 2022-01-19

## 2022-01-19 RX ORDER — LIDOCAINE 40 MG/G
CREAM TOPICAL
Status: DISCONTINUED | OUTPATIENT
Start: 2022-01-19 | End: 2022-01-19 | Stop reason: HOSPADM

## 2022-01-19 RX ORDER — FENTANYL CITRATE 50 UG/ML
INJECTION, SOLUTION INTRAMUSCULAR; INTRAVENOUS PRN
Status: DISCONTINUED | OUTPATIENT
Start: 2022-01-19 | End: 2022-01-19

## 2022-01-19 RX ORDER — ONDANSETRON 2 MG/ML
4 INJECTION INTRAMUSCULAR; INTRAVENOUS EVERY 30 MIN PRN
Status: DISCONTINUED | OUTPATIENT
Start: 2022-01-19 | End: 2022-01-20 | Stop reason: HOSPADM

## 2022-01-19 RX ORDER — OXYCODONE HYDROCHLORIDE 5 MG/1
5 TABLET ORAL EVERY 4 HOURS PRN
Status: DISCONTINUED | OUTPATIENT
Start: 2022-01-19 | End: 2022-01-20 | Stop reason: HOSPADM

## 2022-01-19 RX ORDER — OXYCODONE HYDROCHLORIDE 5 MG/1
5-10 TABLET ORAL EVERY 4 HOURS PRN
Qty: 10 TABLET | Refills: 0 | Status: SHIPPED | OUTPATIENT
Start: 2022-01-19 | End: 2022-02-22

## 2022-01-19 RX ORDER — LIDOCAINE HYDROCHLORIDE 20 MG/ML
INJECTION, SOLUTION INFILTRATION; PERINEURAL PRN
Status: DISCONTINUED | OUTPATIENT
Start: 2022-01-19 | End: 2022-01-19

## 2022-01-19 RX ORDER — SODIUM CHLORIDE, SODIUM LACTATE, POTASSIUM CHLORIDE, CALCIUM CHLORIDE 600; 310; 30; 20 MG/100ML; MG/100ML; MG/100ML; MG/100ML
INJECTION, SOLUTION INTRAVENOUS CONTINUOUS PRN
Status: DISCONTINUED | OUTPATIENT
Start: 2022-01-19 | End: 2022-01-19

## 2022-01-19 RX ORDER — PROPOFOL 10 MG/ML
INJECTION, EMULSION INTRAVENOUS CONTINUOUS PRN
Status: DISCONTINUED | OUTPATIENT
Start: 2022-01-19 | End: 2022-01-19

## 2022-01-19 RX ADMIN — FENTANYL CITRATE 25 MCG: 50 INJECTION, SOLUTION INTRAMUSCULAR; INTRAVENOUS at 13:08

## 2022-01-19 RX ADMIN — PROPOFOL 150 MCG/KG/MIN: 10 INJECTION, EMULSION INTRAVENOUS at 13:05

## 2022-01-19 RX ADMIN — FENTANYL CITRATE 25 MCG: 50 INJECTION, SOLUTION INTRAMUSCULAR; INTRAVENOUS at 13:20

## 2022-01-19 RX ADMIN — GABAPENTIN 300 MG: 300 CAPSULE ORAL at 10:30

## 2022-01-19 RX ADMIN — LIDOCAINE HYDROCHLORIDE 60 MG: 20 INJECTION, SOLUTION INFILTRATION; PERINEURAL at 12:47

## 2022-01-19 RX ADMIN — PROPOFOL 200 MG: 10 INJECTION, EMULSION INTRAVENOUS at 12:47

## 2022-01-19 RX ADMIN — CEFAZOLIN SODIUM 2 G: 2 SOLUTION INTRAVENOUS at 12:54

## 2022-01-19 RX ADMIN — ACETAMINOPHEN 975 MG: 325 TABLET ORAL at 10:30

## 2022-01-19 RX ADMIN — DEXAMETHASONE SODIUM PHOSPHATE 4 MG: 4 INJECTION, SOLUTION INTRA-ARTICULAR; INTRALESIONAL; INTRAMUSCULAR; INTRAVENOUS; SOFT TISSUE at 12:54

## 2022-01-19 RX ADMIN — ONDANSETRON 4 MG: 2 INJECTION INTRAMUSCULAR; INTRAVENOUS at 12:54

## 2022-01-19 RX ADMIN — SODIUM CHLORIDE, SODIUM LACTATE, POTASSIUM CHLORIDE, CALCIUM CHLORIDE: 600; 310; 30; 20 INJECTION, SOLUTION INTRAVENOUS at 12:38

## 2022-01-19 RX ADMIN — PROPOFOL 200 MCG/KG/MIN: 10 INJECTION, EMULSION INTRAVENOUS at 12:47

## 2022-01-19 ASSESSMENT — LIFESTYLE VARIABLES: TOBACCO_USE: 0

## 2022-01-19 NOTE — ANESTHESIA POSTPROCEDURE EVALUATION
Patient: Belkis Butler    Procedure: Procedure(s):  CYSTOURETEROSCOPY, WITH RETROGRADE PYELOGRAM, HOLMIUM LASER LITHOTRIPSY, LEFT STENT INSERTION       Diagnosis:Ureteral stone [N20.1]  Diagnosis Additional Information: No value filed.    Anesthesia Type:  General    Note:  Disposition: Outpatient   Postop Pain Control: Uneventful            Sign Out: Well controlled pain   PONV: No   Neuro/Psych: Uneventful            Sign Out: Acceptable/Baseline neuro status   Airway/Respiratory: Uneventful            Sign Out: Acceptable/Baseline resp. status   CV/Hemodynamics: Uneventful            Sign Out: Acceptable CV status; No obvious hypovolemia; No obvious fluid overload   Other NRE: NONE   DID A NON-ROUTINE EVENT OCCUR? No    Event details/Postop Comments:  Doing well. Alert, oriented. No sore throat, nausea, or problems with pain control. Patient denies any concerns.              Last vitals:  Vitals Value Taken Time   /87 01/19/22 1359   Temp 36  C (96.8  F) 01/19/22 1359   Pulse 55 01/19/22 1359   Resp 16 01/19/22 1359   SpO2 100 % 01/19/22 1359       Electronically Signed By: Yamilet Ocasio MD  January 19, 2022  2:35 PM

## 2022-01-19 NOTE — PROGRESS NOTES
I personally met with Belkis Butler and discussed their current medical situation.  We reviewed the nature of planned surgery, the risks benefits and complications and treatment alternatives.  Shared decision made to proceed with left ureteroscopic stone removal.

## 2022-01-19 NOTE — DISCHARGE INSTRUCTIONS
"[facility]  Home Care Following Anesthesia  For 24 hours after surgery:  1. Get plenty of rest.  A responsible adult must stay with you for at least 24 hours after you leave the surgery center.  2. Do not drive or use heavy equipment.  If you have weakness or tingling, don't drive or use heavy equipment until this feeling goes away.   3. Do not drink alcohol.   4. Avoid strenuous or risky activities.  Ask for help when climbing stairs.  5. You may feel lightheaded.  IF so, sit for a few minutes before standing.  Have someone help you get up.   6. If you have nausea (feel sick to your stomach): Drink only clear liquids such as apple juice, ginger ale, broth or 7-Up.  Rest may also help.  Be sure to drink enough fluids.  Move to a regular diet as you feel able.   7. You may have a slight fever.  Call the doctor if your fever is over 100 F (37.7 C) (taken under the tongue) or lasts longer than 24 hours.  8. You may have a dry mouth, a sore throat, muscle aches or trouble sleeping. These should go away after 24 hours.  9. Do not make important or legal decisions.   10. It is recommended to avoid smoking.   If you use hormonal birth control (such as the pill, patch, ring or implants):  You will need a second form of birth control for 7 days (condoms, a diaphragm or contraceptive foam).  While in the surgery center, you received a medicine called Sugammadex.  Hormonal birth control (such as the pill, patch, ring or implants) will not work as well for a week after taking this medicine.  Today you received a Marcaine or bupivacaine block to numb the nerves near your surgery site.  This is a block using local anesthetic or \"numbing\" medication injected around the nerves to anesthetize or \"numb\" the area supplied by those nerves.  This block is injected into the muscle layer near your surgical site.  The medication may numb the location where you had surgery for 6-18 hours, but may last " up to 24 hours.  If your surgical site is an arm or leg you should be careful with your affected limb, since it is possible to injure your limb without being aware of it due to the numbing.  Until full feeling returns, you should guard against bumping or hitting your limb, and avoid extreme hot or cold temperatures on the skin.  As the block wears off, the feeling will return as a tingling or prickly sensation near your surgical site.  You will experience more discomfort from your incision as the feeling returns.  You may want to take a pain pill (a narcotic or Tylenol if this was prescribed by your surgeon) when you start to experience mild pain before the pain beccomes more severe.  If your pain medications do not control your pain you should notifiy your surgeon.    Tips for taking pain medications  To get the best pain relief possible, remember these points:    Take pain medications as directed, before pain becomes severe.    Pain medication can upset your stomach: taking it with food may help.    Constipation is a common side effect of pain medication. Drink plenty of  fluids.    Eat foods high in fiber. Take a stool softener if recommended by your doctor or pharmacist.    Do not drink alcohol, drive or operate machinery while taking pain medications.    Ask about other ways to control pain, such as with heat, ice or relaxation.    Tylenol/Acetaminophen Consumption:  Tylenol 975 given at 1030 am.  Next dose available at 430 pm.  To help encourage the safe use of acetaminophen, the makers of TYLENOL  have lowered the maximum daily dose for single-ingredient Extra Strength TYLENOL  (acetaminophen) products sold in the [place] from 8 pills per day (4,000 mg) to 6 pills per day (3,000 mg). The dosing interval has also changed from 2 pills every 4-6 hours to 2 pills every 6 hours.    If you feel your pain relief is insufficient, you may take Tylenol/Acetaminophen in addition to your narcotic pain medication.     Be  careful not to exceed 3,000 mg of Tylenol/Acetaminophen in a 24 hour period from all sources.    If you are taking extra strength Tylenol/acetaminophen (500 mg), the maximum dose is 6 tablets in 24 hours.    If you are taking regular strength acetaminophen (325 mg), the maximum dose is 9 tablets in 24 hours.    Call a doctor for any of the followin. Signs of infection (fever, growing tenderness at the surgery site, a large amount of drainage or bleeding, severe pain, foul-smelling drainage, redness, swelling).  2. It has been over 8 to 10 hours since surgery and you are still not able to urinate (pass water).  3. Headache for over 24 hours.  4. Numbness, tingling or weakness the day after surgery (if you had spinal anesthesia).  5. Signs of Covid-19 infection (temperature over 100 degrees, shortness of breath, cough, loss of taste/smell, generalized body aches, persistent headache, chills, sore throat, nausea/vomiting/diarrhea)  Your doctor is:  Dr. Juan Abebe, Prostate and Urology: [phone]  Or dial [phone] and ask for the resident on call for:  Prostate Urology  For emergency care, call the:  [facility]:  [phone] (TTY for hearing impaired: [phone])

## 2022-01-19 NOTE — INTERVAL H&P NOTE
I have reviewed the surgical (or preoperative) H&P that is linked to this encounter, and examined the patient. There are no significant changes   No

## 2022-01-19 NOTE — OP NOTE
OPERATIVE REPORT    PREOPERATIVE DIAGNOSIS:  Left ureteral stone(s)    POSTOPERATIVE DIAGNOSIS: Same    PROCEDURES PERFORMED:   -Cystourethroscopy  -Left retrograde pyelogram with intraoperative interpretation of images  -Left ureteroscopy  -Left holmium laser lithotripsy  -Left basket stone retrieval  -Left ureteral stent placement    STAFF SURGEON: Dr. Juan Abebe MD  RESIDENT(S): Brendan Browne MD    ANESTHESIA: General  ESTIMATED BLOOD LOSS: 1 ml  COMPLICATIONS: None.   SPECIMEN: Stone for analysis   URETERAL STENT(S):  - Left 6 x 24 cm double-J ureteral stent.  Reason for stenting: Other (impacted stone).      SIGNIFICANT FINDINGS:   -Stone free with no fragments on the left  -Left RPG notable for moderate hydronephrosis, no filling defects.     Target stone location:Ureter  Approximate target stone size: 5-10 mm  Laser used: Yes  Multiple stones treated: No      BRIEF OPERATIVE INDICATIONS: Belkis Butler is a(n) 46 year old female who presented with a distal left ureteral calculi.  After a discussion of all risks, benefits, and alternatives, the patient elected to proceed with definitive stone management. The patient understands the potential need for more than one procedure to eliminate all stone burden.      DESCRIPTION OF PROCEDURE:  After informed consent was obtained, the patient was transported to the operating room & placed supine on the table. After adequate anesthesia was induced, the patient was placed in lithotomy and prepped and draped in the usual sterile fashion. A timeout was taken to confirm correct patient, procedure and laterality. Pre-operative IV antibiotics were administered.     A 22-Setswana rigid cystoscope was inserted into a well-lubricated urethra. The urethra was unremarkable without stricture.     The left ureteral orifice was identified and cannulated with a Sensor wire with the aid of a 5F open-ended catheter. The wire passed without resistance into the upper pole.  A  retrograde pyelogram was then performed revealing moderate hydronephrosis    A semi-rigid ureteroscope was introduced and advanced up the ureter alongside the wire.  An approximately 9 mm stone was identified in the distal ureter.  A 200 micron laser fiber was used at a setting of .8 J and 8 Hz and lithotripsy was performed.  A tipless basket was used to remove all fragments greater than 2 mm.      Pullback ureteroscopy showed no residual stone fragments or significant ureteral injury.    A 6 x 24 cm double-J stent was advanced over the Sensor wire, and a good proximal curl was seen in the renal pelvis on fluoroscopy and the distal curl was seen in the bladder. A string was left attached to the stent.  The bladder was drained.    The patient tolerated the procedure well and there were no apparent complications. The patient  was transported to the postanesthesia care unit in stable condition.        POSTOP PLAN:  -f/u in 1m w/ PA in clinic, RBUS prior  -stent to be removed in 1 week by patient    I, Juan Abebe, was present and participatory for the entirety of the procedure

## 2022-01-19 NOTE — ANESTHESIA PREPROCEDURE EVALUATION
Anesthesia Pre-Procedure Evaluation    Patient: Belkis Butler   MRN: [id number] : 1975        Preoperative Diagnosis: Ureteral stone [N20.1]    Procedure : Procedure(s):  CYSTOURETEROSCOPY, WITH RETROGRADE PYELOGRAM, HOLMIUM LASER LITHOTRIPSY, LEFT STENT INSERTION          Past Medical History:   Diagnosis Date     PCOS (polycystic ovarian syndrome)       Past Surgical History:   Procedure Laterality Date     D & C        Allergies   Allergen Reactions     Penicillins       Social History     Tobacco Use     Smoking status: Never Smoker     Smokeless tobacco: Never Used   Substance Use Topics     Alcohol use: Yes     Comment: A glass of wine per week      Wt Readings from Last 1 Encounters:   22 79.3 kg (174 lb 13.2 oz)        Anesthesia Evaluation   Pt has had prior anesthetic. Type: MAC.    No history of anesthetic complications       ROS/MED HX  ENT/Pulmonary:    (-) tobacco use, asthma and recent URI   Neurologic:       Cardiovascular:       METS/Exercise Tolerance: >4 METS    Hematologic:  - neg hematologic  ROS     Musculoskeletal:  - neg musculoskeletal ROS     GI/Hepatic:    (-) GERD   Renal/Genitourinary:     (+) Nephrolithiasis ,     Endo: Comment: PCOS       Psychiatric/Substance Use:  - neg psychiatric ROS     Infectious Disease:  - neg infectious disease ROS     Malignancy:  - neg malignancy ROS     Other:  - neg other ROS          Physical Exam    Airway        Mallampati: I   TM distance: > 3 FB   Neck ROM: full   Mouth opening: > 3 cm    Respiratory Devices and Support         Dental  no notable dental history         Cardiovascular   cardiovascular exam normal       Rhythm and rate: regular and normal     Pulmonary   pulmonary exam normal                OUTSIDE LABS:  CBC:   Lab Results   Component Value Date    WBC 11.5 (H) 2022    WBC 6.2 2016    HGB 14.6 2022    HGB 14.3 2016    HCT 43.2 2022    HCT 42.7 2016     2022      09/12/2016     BMP:   Lab Results   Component Value Date     01/16/2022     09/12/2016    POTASSIUM 3.9 01/16/2022    POTASSIUM 4.0 09/12/2016    CHLORIDE 109 01/16/2022    CHLORIDE 107 09/12/2016    CO2 21 01/16/2022    CO2 24 09/12/2016    BUN 20 01/16/2022    BUN 14 09/12/2016    CR 1.12 (H) 01/16/2022    CR 0.83 09/12/2016     (H) 01/16/2022    GLC 93 09/12/2016     COAGS: No results found for: PTT, INR, FIBR  POC:   Lab Results   Component Value Date    HCG Negative 01/19/2022     HEPATIC:   Lab Results   Component Value Date    ALBUMIN 4.1 01/16/2022    PROTTOTAL 7.6 01/16/2022    ALT 18 01/16/2022    AST 15 01/16/2022    ALKPHOS 62 01/16/2022    BILITOTAL 1.0 01/16/2022     OTHER:   Lab Results   Component Value Date    A1C 5.6 09/12/2016    MAGGIE 9.2 01/16/2022    LIPASE 118 01/16/2022    TSH 3.78 09/12/2016    T4 1.09 09/12/2016       Anesthesia Plan    ASA Status:  1   NPO Status:  NPO Appropriate    Anesthesia Type: General.     - Airway: LMA   Induction: Intravenous.   Maintenance: Inhalation.        Consents    Anesthesia Plan(s) and associated risks, benefits, and realistic alternatives discussed. Questions answered and patient/representative(s) expressed understanding.     - Discussed: Risks, Benefits and Alternatives for BOTH SEDATION and the PROCEDURE were discussed     - Discussed with:  Patient      - Extended Intubation/Ventilatory Support Discussed: No.      - Patient is DNR/DNI Status: No    Use of blood products discussed: No .     Postoperative Care    Pain management: IV analgesics, Oral pain medications, Peripheral nerve block (Single Shot).   PONV prophylaxis: Ondansetron (or other 5HT-3), Dexamethasone or Solumedrol, Background Propofol Infusion     Comments:    Other Comments: Patient was hoping to do this under MAC, but we discussed that this surgery would likely be poorly tolerated without general anesthesia (Dr. Abebe also confirmed this). I further discussed what  general anesthesia would entail, primary plan of LMA (back plan of ETT), PONV prophylaxis plan. Patient understands and is happy to proceed.     Discussed risks of general anesthesia, including aspiration pneumonia, sore throat/hoarse voice, abrasions/damage to lips/tongue/teeth, nausea, rare complications (including cardiac, pulmonary). Ensured understanding, invited questions and all questions were answered. Patient wishes to proceed.            Tony Blanco III, MD

## 2022-01-19 NOTE — ANESTHESIA CARE TRANSFER NOTE
Patient: Belkis Butler    Procedure: Procedure(s):  CYSTOURETEROSCOPY, WITH RETROGRADE PYELOGRAM, HOLMIUM LASER LITHOTRIPSY, LEFT STENT INSERTION       Diagnosis: Ureteral stone [N20.1]  Diagnosis Additional Information: No value filed.    Anesthesia Type:   General     Note:    Oropharynx: spontaneously breathing  Level of Consciousness: awake  Oxygen Supplementation: face mask  Level of Supplemental Oxygen (L/min / FiO2): 6  Independent Airway: airway patency satisfactory and stable  Dentition: dentition unchanged  Vital Signs Stable: post-procedure vital signs reviewed and stable  Report to RN Given: handoff report given  Patient transferred to: PACU    Handoff Report: Identifed the Patient, Identified the Reponsible Provider, Reviewed the pertinent medical history, Discussed the surgical course, Reviewed Intra-OP anesthesia mangement and issues during anesthesia, Set expectations for post-procedure period and Allowed opportunity for questions and acknowledgement of understanding      Vitals:  Vitals Value Taken Time   /84 01/19/22 1345   Temp 36  C (96.8  F) 01/19/22 1345   Pulse 73 01/19/22 1345   Resp 16 01/19/22 1345   SpO2 97 % 01/19/22 1345       Electronically Signed By: FILOMENA Greene CRNA  January 19, 2022  1:50 PM

## 2022-01-20 ENCOUNTER — TELEPHONE (OUTPATIENT)
Dept: UROLOGY | Facility: CLINIC | Age: 47
End: 2022-01-20
Payer: COMMERCIAL

## 2022-01-20 NOTE — TELEPHONE ENCOUNTER
[facility] Call Center    Phone Message    May a detailed message be left on voicemail: yes     Reason for Call: Other: Pt called and stated shes traveling to [place] today, wondering if she is able to take a bath and able to be in a pool. Please call Pt to further discuss. Thanks!      Sending as high priority as she is leaving shortly for [place].    Action Taken: Message routed to:  [facility] ([facility] Uro    Travel Screening: Not Applicable

## 2022-01-20 NOTE — TELEPHONE ENCOUNTER
Spoke to pt. Informed pt that she's ok to bath and swim. Pt verbalized understanding. No other questions.     Lila Ureña RN MSN

## 2022-01-21 ENCOUNTER — TELEPHONE (OUTPATIENT)
Dept: UROLOGY | Facility: CLINIC | Age: 47
End: 2022-01-21
Payer: COMMERCIAL

## 2022-01-22 LAB
APPEARANCE STONE: NORMAL
COMPN STONE: NORMAL
SPECIMEN WT: 28 MG

## 2022-01-24 ENCOUNTER — TELEPHONE (OUTPATIENT)
Dept: UROLOGY | Facility: CLINIC | Age: 47
End: 2022-01-24
Payer: COMMERCIAL

## 2022-01-27 ENCOUNTER — TRANSFERRED RECORDS (OUTPATIENT)
Dept: HEALTH INFORMATION MANAGEMENT | Facility: CLINIC | Age: 47
End: 2022-01-27
Payer: COMMERCIAL

## 2022-01-27 ENCOUNTER — MEDICAL CORRESPONDENCE (OUTPATIENT)
Dept: HEALTH INFORMATION MANAGEMENT | Facility: CLINIC | Age: 47
End: 2022-01-27
Payer: COMMERCIAL

## 2022-01-27 ENCOUNTER — TRANSCRIBE ORDERS (OUTPATIENT)
Dept: OTHER | Age: 47
End: 2022-01-27
Payer: COMMERCIAL

## 2022-01-27 DIAGNOSIS — Z12.11 SCREENING FOR COLON CANCER: Primary | ICD-10-CM

## 2022-01-31 ENCOUNTER — TELEPHONE (OUTPATIENT)
Dept: GASTROENTEROLOGY | Facility: CLINIC | Age: 47
End: 2022-01-31
Payer: COMMERCIAL

## 2022-01-31 ENCOUNTER — TRANSCRIBE ORDERS (OUTPATIENT)
Dept: OTHER | Age: 47
End: 2022-01-31
Payer: COMMERCIAL

## 2022-01-31 DIAGNOSIS — L81.9 PIGMENTED SKIN LESIONS: Primary | ICD-10-CM

## 2022-01-31 NOTE — TELEPHONE ENCOUNTER
Screening Questions  Blue=prep questions Red=location Green=sedation   1. Are you active on mychart? Y    2. What insurance is in the chart? [employer]     3.  Ordering/Referring Provider: Poppy Pascual    4. BMI 30.0 , If greater than 40 review exclusion criteria also will need EXTENDED PREP    5.  Respiratory Screening (If yes to any of the following HOSPITAL setting only):     Do you use daily home oxygen? N  Do you have mod to severe Obstructive Sleep Apnea? N (can be seen at [facility] or hospital setting)    Do you have Pulmonary Hypertension? N   Do you have UNCONTROLLED asthma? N    6. Have you had a heart or lung transplant? N  (If yes, please review exclusion criteria)    7. Are you currently on dialysis?N  (If yes, schedule in HOSPITAL setting only)(If yes, please send Golytely prep)    8. Do you have chronic kidney disease? N (If yes, please send Golytely prep)    9. Have you had a stroke or Transient ischemic attack (TIA) within 6 months? N (If yes, do not schedule at [facility])    10. In the past 6 months, have you had any heart related issues including cardiomyopathy or heart attack? N (If yes, please review exclusion criteria)           If yes, did it require cardiac stenting or other implantable device?N  (If yes, please review exclusion criteria)      11. Do you have any implantable devices in your body (pacemaker, defib, LVAD)? N (If yes, schedule at [facility])    12. Do you take nitroglycerin? If yes, how often? N (if yes, schedule at HOSPITAL setting)    13. Are you currently taking any blood thinners?N (If yes- inform patient to follow up with PCP or provider for follow up instructions)     14. Are you a diabetic? N (If yes, please send Golytely prep)    15. (Females) Are you currently pregnant? N  If yes, how many weeks?      16. Are you taking any prescription pain medications on a routine schedule? N If yes, MAC sedation and patient will need EXTENDED PREP.    17. Do you have any chemical dependencies such as  alcohol, street drugs, or methadone? N If yes, MAC sedation     18. Do you have any history of post-traumatic stress syndrome, severe anxiety or history of psychosis? N  If yes, MAC sedation.     19. Do you transfer independently? Y    20.  Do you have any issues with constipation? N   If yes, pt will need EXTENDED PREP     21. Preferred Pharmacy for Pre Prescription     Scheduling Details    Which Colonoscopy Prep was Sent?: M Prep  Type of Procedure Scheduled: Colon  Surgeon: Aldair  Date of Procedure: 2-23  [place] [facility] [place]er (Please ask for phone number if not scheduled by patient): [facility]      Sedation Type: CS  Conscious Sedation- Needs  for 6 hours after the procedure  MAC/General-Needs  for 24 hours after procedure    Pre-op Required at [facility] [place] [place] and OR for MAC sedation:   (if yes advise patient they will need a pre-op prior to procedure)      Informed patient they will need an adult  Y  Cannot take any type of public or medical transportation alone    Pre-Procedure Covid test to be completed at [facility] or Externally: Y 2-21 [facility] [place]    Confirmed Nurse will call to complete assessment Y    Additional comments:  (DE JOHANNA'S PATIENTS NEED EXTENDED PREP)

## 2022-02-06 DIAGNOSIS — Z11.59 ENCOUNTER FOR SCREENING FOR OTHER VIRAL DISEASES: Primary | ICD-10-CM

## 2022-02-17 ENCOUNTER — ANCILLARY PROCEDURE (OUTPATIENT)
Dept: ULTRASOUND IMAGING | Facility: CLINIC | Age: 47
End: 2022-02-17
Attending: UROLOGY
Payer: COMMERCIAL

## 2022-02-17 ENCOUNTER — TELEPHONE (OUTPATIENT)
Dept: GASTROENTEROLOGY | Facility: CLINIC | Age: 47
End: 2022-02-17

## 2022-02-17 DIAGNOSIS — N20.1 URETERAL STONE: ICD-10-CM

## 2022-02-17 PROCEDURE — 76770 US EXAM ABDO BACK WALL COMP: CPT | Mod: GC | Performed by: RADIOLOGY

## 2022-02-17 NOTE — TELEPHONE ENCOUNTER
Attempted to contact patient regarding upcoming colonoscopy procedure on 2.23.2022 for pre assessment questions. No answer.     Left message to return call to [phone] #2.  Padinmotionhart message sent.    Covid test scheduled: 2.21.2022    Arrival time: 0700    Facility location: [facility]    Sedation type: CS    Indication for procedure: screening colonoscopy    Bowel prep recommendation: Miralax/Magnesium citrate/Dulcolax; Is pt taking the oxycodone on med list regularly?  Verify bowel habits.    Lakisha Apple RN

## 2022-02-18 ENCOUNTER — PRE VISIT (OUTPATIENT)
Dept: UROLOGY | Facility: CLINIC | Age: 47
End: 2022-02-18
Payer: COMMERCIAL

## 2022-02-18 NOTE — TELEPHONE ENCOUNTER
Patient returned call.     Pre assessment questions completed for upcoming colonoscopy procedure scheduled on 2/23/22    COVID test scheduled 2/21/22    Reviewed procedural arrival time 0700 and facility location [facility].    Designated  policy reviewed. Instructed to have someone stay 6 hours post procedure.     Reviewed Miralax prep instructions with patient in great detail as patient stated they are not able to get into mychart. No fiber/iron supplements or foods that contain nuts/seeds 7 days prior to procedure.     Patient denies taking oxycodone.     Anticoagulation/blood thinners? no    Electronic implanted devices? no    Patient verbalized understanding and had no questions or concerns at this time.    Randa Hinton RN

## 2022-02-18 NOTE — TELEPHONE ENCOUNTER
Reason for visit: post op      Dx/Hx/Sx: left ureteral stones     Records/imaging/labs/orders: renal US in epic     At Rooming: video visit

## 2022-02-21 ENCOUNTER — TELEPHONE (OUTPATIENT)
Dept: GASTROENTEROLOGY | Facility: CLINIC | Age: 47
End: 2022-02-21
Payer: COMMERCIAL

## 2022-02-21 NOTE — TELEPHONE ENCOUNTER
Caller: KRISH     Procedure: COLONSOCOPY     Date, Location, and Surgeon of Procedure Cancelled: 2/24/2022, JEFF MG     Ordering Provider:    Reason for cancel (please be detailed, any staff messages or encounters to note?): PATIENT HAD A DEATH IN THE FAMILY WILL BE FLYING TO [place]         Rescheduled: YES     If rescheduled:    Date: 4/6/2022   [place] [facility] [place]   Note any change or update to original order/sedation: NO

## 2022-02-22 ENCOUNTER — VIRTUAL VISIT (OUTPATIENT)
Dept: UROLOGY | Facility: CLINIC | Age: 47
End: 2022-02-22
Payer: COMMERCIAL

## 2022-02-22 DIAGNOSIS — N20.0 KIDNEY STONE: Primary | ICD-10-CM

## 2022-02-22 PROCEDURE — 99213 OFFICE O/P EST LOW 20 MIN: CPT | Mod: 95 | Performed by: NURSE PRACTITIONER

## 2022-02-22 ASSESSMENT — PAIN SCALES - GENERAL: PAINLEVEL: NO PAIN (0)

## 2022-02-22 NOTE — NURSING NOTE
Chief Complaint   Patient presents with     Surgical Followup     Post op/kidney stones     Nancy Harden, [facility]

## 2022-02-22 NOTE — PROGRESS NOTES
Belkis is a 47 year old who is being evaluated via a billable video visit.      How would you like to obtain your AVS? MyChart  If the video visit is dropped, the invitation should be resent by: Text to cell phone: [phone]  Will anyone else be joining your video visit? No    Video Start Time: 10:05 AM  Video-Visit Details    Type of service:  Video Visit    Video End Time: 10:20 AM    Originating Location (pt. Location): Home    Distant Location (provider location):  [facility] [place]     Platform used for Video Visit: Rodriguze Tamayo Brendan Butler complains of   Chief Complaint   Patient presents with     Surgical Followup     Post op/kidney stones       Assessment/Plan:  47 year old female with a history of kidney stones, s/p URS/HLL for large left UVJ stone, composed of CaOx. We reviewed general recommendations to prevent kidney stones including the followin) Low oxalate diet. We discussed foods that are particularly high in oxalate including spinach, rhubarb, beets, nuts, nut butters, and black teas.     2) Low salt diet. Discussed common foods with high amounts of salt as well as dietary strategies to minimize sodium.     3) High fluid intake. Recommend 3 liters of fluid daily to produce goal of 2.5L of urine.     4) Modest animal protein. Recommend limiting animal protein to one serving or less daily.     5) Normal dietary calcium.    -Will pursue Litholink x1 for further stone prevention guidance.   -She will follow up with me to review Litholink results, once complete.     Mally Portillo, CNP  Department of Urology      Subjective:   47 year old female who presents for follow up regarding kidney stones. She is s/p left-sided URS/HLL with Dr. Abebe on 22 for treatment of an obstructing 9 mm stone at her left UVJ. Stone composed of calcium oxalate. Follow up JORGE unremarkable.     She has a history of small stones back in ~ that she passed on her own. She  has no know family history of stones.     Regarding her diet, she feels that she stays well hydrated. She does consume spinach and nuts on a fairly regular basis so will plan to reduce this. She eats cheese but otherwise does not consume many other dairy products. She generally keeps to a low-sodium and does not eat much meat.       Objective:     Exam:   Patient is a 47 year old female   No vital signs obtained due to virtual visit.  General Appearance: Well groomed, hygenic  Respiratory: No cough, no respiratory distress or labored breathing  Musculoskeletal: Grossly normal with no gross deficits  Skin: No discoloration or apparent rashes  Neurologic: No tremors  Psychiatric: Alert and oriented  Further examination is deferred due to the nature of our visit.

## 2022-02-22 NOTE — PATIENT INSTRUCTIONS
UROLOGY CLINIC VISIT PATIENT INSTRUCTIONS    I have ordered the Litholink 24-hour urine collection study to be done. This kit will be sent to your home address. If you do not receive the Litholink in 7-10 days please call [phone]. Once you complete the kit and return it, please call and schedule a visit to review the results.  -Please see below info regarding stone prevention in the meantime.     If you have any issues, questions or concerns, do not hesitate to contact us at [phone] or via TiVo.     Mally Portillo, CNP  Department of Urology       Calcium Oxalate Stone Prevention Self Management     Drink more fluids:     Drinking more liquids is the best way you can help prevent future stones. Stones can form when substances in the urine are too concentrated. The more you drink, the more urine you will make. This means all substances in the urine will be less concentrated.     How much urine should I be producing?     The usual recommended daily urine production is about 2 to 3 quarts (4339-8667 ml). If you are producing more than 3 quarts of urine on a regular basis, it is possible to deplete important minerals stored in the body.     To measure the amount of urine you produce in a day, you can either:    Collect all urine in a container and measure at the end of the day     Use a measuring cup each time you urinate and add up the amounts at the end of the day      Observe    Color - Dark darlin urine is concentrated. Light straw color or lighter is dilute and desirable     Odor - Concentrated urine tends to smell stronger. Dilute urine is nearly odorless     Ways to increase your fluid intake     Increasing the amount of fluid you drink is effective for all types of kidney stones. While water is commonly recommended, all fluids are effective for increasing the amount of urine your body produces.    Focus on starting a lifelong habit, rather than a short-term solution.     Keep liquids on hand that  you like. Crystal Light is a low calorie appropriate choice.    Drink out of larger glasses. You'll tend to drink more with each serving.     Have an additional glass of fluid with each meal.     Keep a water or drink bottle at work and fill it regularly.      *If you are prone to fluid retention, consult your doctor before making changes to your fluid habits.     Low Oxalate Diet:     Avoid excess amounts or daily consumption of these foods:    All nuts and nut products including peanuts, almonds, pecans, peanut butter, almond milk    Rhubarb    Chocolate    Soybeans and soy products     Spinach    Wheat Germ    Beets     Maintain a normal calcium diet:     Researches have found that people with low calcium intakes tend to have more stones. Foods with high calcium content are acceptable and include:    Dairy products (including milk, cheese and yogurt)    Meat and fish    Enriched cereals    Dark green vegetables     What about calcium supplements?      Many people take calcium supplements, either on their own or as prescribed by a doctor. Research has indicated that calcium supplements do not usually pose a risk for stone formation.  Calcium citrate is a better choice for a supplement.     Avoid excess salt:     Salt (sodium chloride) is found in abundance in many foods. High sodium levels in the urine can interfere with the kidney's handling of calcium.      Tips for reducing the salt in your diet:    Don't use salt at the table    Reduce the salt used in food preparation. Try 1/2 teaspoon when recipes call for 1 teaspoon.    Use herbs and spices for flavoring instead of salt.    Avoid salty foods.    Check the label before you buy or use a product. Note sodium and portion size information.    Try to consume less than 2,000 mg/day. (1 teaspoon = 2,000 mg)     Foods with high sodium content include:    Processed meat (including luncheon meats, sausage)     Crackers     Instant cereal     Processed cheese      Canned soups     Chips and snack foods     Soy sauce

## 2022-02-22 NOTE — LETTER
2022       RE: Belkis Butler  [address]  [place] [address]     Dear Colleague,    Thank you for referring your patient, Belkis Butler, to the [facility] [place] at [facility]. Please see a copy of my visit note below.    Belkis is a 47 year old who is being evaluated via a billable video visit.      How would you like to obtain your AVS? MyChart  If the video visit is dropped, the invitation should be resent by: Text to cell phone: [phone]  Will anyone else be joining your video visit? No    Video Start Time: 10:05 AM  Video-Visit Details    Type of service:  Video Visit    Video End Time: 10:20 AM    Originating Location (pt. Location): Home    Distant Location (provider location):  [facility] [place]     Platform used for Video Visit: [employer]       Belkis Butler complains of   Chief Complaint   Patient presents with     Surgical Followup     Post op/kidney stones       Assessment/Plan:  47 year old female with a history of kidney stones, s/p URS/HLL for large left UVJ stone, composed of CaOx. We reviewed general recommendations to prevent kidney stones including the followin) Low oxalate diet. We discussed foods that are particularly high in oxalate including spinach, rhubarb, beets, nuts, nut butters, and black teas.     2) Low salt diet. Discussed common foods with high amounts of salt as well as dietary strategies to minimize sodium.     3) High fluid intake. Recommend 3 liters of fluid daily to produce goal of 2.5L of urine.     4) Modest animal protein. Recommend limiting animal protein to one serving or less daily.     5) Normal dietary calcium.    -Will pursue Litholink x1 for further stone prevention guidance.   -She will follow up with me to review Litholink results, once complete.     Mally Portillo, CNP  Department of Urology      Subjective:   47 year old female who  presents for follow up regarding kidney stones. She is s/p left-sided URS/HLL with Dr. Abebe on 1/19/22 for treatment of an obstructing 9 mm stone at her left UVJ. Stone composed of calcium oxalate. Follow up JORGE unremarkable.     She has a history of small stones back in ~2008 that she passed on her own. She has no know family history of stones.     Regarding her diet, she feels that she stays well hydrated. She does consume spinach and nuts on a fairly regular basis so will plan to reduce this. She eats cheese but otherwise does not consume many other dairy products. She generally keeps to a low-sodium and does not eat much meat.       Objective:     Exam:   Patient is a 47 year old female   No vital signs obtained due to virtual visit.  General Appearance: Well groomed, hygenic  Respiratory: No cough, no respiratory distress or labored breathing  Musculoskeletal: Grossly normal with no gross deficits  Skin: No discoloration or apparent rashes  Neurologic: No tremors  Psychiatric: Alert and oriented  Further examination is deferred due to the nature of our visit.

## 2022-03-07 ENCOUNTER — MEDICAL CORRESPONDENCE (OUTPATIENT)
Dept: HEALTH INFORMATION MANAGEMENT | Facility: CLINIC | Age: 47
End: 2022-03-07
Payer: COMMERCIAL

## 2022-03-31 NOTE — TELEPHONE ENCOUNTER
Rescheduled procedure.     Patient scheduled for colonoscopy  on 4/6/22.     Covid test scheduled: 4/2/22    Arrival time: 0630    Facility location: [facility]    Sedation type: CS    Indication for procedure: screening    Anticoagulations? no     Bowel prep recommendation: Miralax/Magnesium citrate/Dulcolax     Pre visit planning completed.    Randa Hinton RN

## 2022-03-31 NOTE — TELEPHONE ENCOUNTER
Attempted to contact patient  for pre assessment questions. No answer.     Left message to return call to [phone] #2    Randa Hinton RN

## 2022-04-02 ENCOUNTER — LAB (OUTPATIENT)
Dept: LAB | Facility: CLINIC | Age: 47
End: 2022-04-02
Payer: COMMERCIAL

## 2022-04-02 DIAGNOSIS — Z11.59 ENCOUNTER FOR SCREENING FOR OTHER VIRAL DISEASES: ICD-10-CM

## 2022-04-02 LAB — SARS-COV-2 RNA RESP QL NAA+PROBE: NEGATIVE

## 2022-04-02 PROCEDURE — U0003 INFECTIOUS AGENT DETECTION BY NUCLEIC ACID (DNA OR RNA); SEVERE ACUTE RESPIRATORY SYNDROME CORONAVIRUS 2 (SARS-COV-2) (CORONAVIRUS DISEASE [COVID-19]), AMPLIFIED PROBE TECHNIQUE, MAKING USE OF HIGH THROUGHPUT TECHNOLOGIES AS DESCRIBED BY CMS-2020-01-R: HCPCS | Mod: 90 | Performed by: PATHOLOGY

## 2022-04-02 PROCEDURE — U0005 INFEC AGEN DETEC AMPLI PROBE: HCPCS | Mod: 90 | Performed by: PATHOLOGY

## 2022-04-02 PROCEDURE — 99000 SPECIMEN HANDLING OFFICE-LAB: CPT | Performed by: PATHOLOGY

## 2022-04-05 ENCOUNTER — ANESTHESIA EVENT (OUTPATIENT)
Dept: SURGERY | Facility: AMBULATORY SURGERY CENTER | Age: 47
End: 2022-04-05
Payer: COMMERCIAL

## 2022-04-05 NOTE — TELEPHONE ENCOUNTER
Pre assessment questions completed for upcoming colonoscopy procedure scheduled on 4.6.2022    COVID test 4.2.2022-negative    Reviewed procedural arrival time 0630 and facility location [facility].    Designated  policy reviewed. Instructed to have someone stay 24 hours post procedure.     Anticoagulation/blood thinners? no    Electronic implanted devices? no    Reviewed Miralax/Magnesium citrate/Dulcolax prep instructions with patient. No fiber/iron supplements or foods that contain nuts/seeds prior to procedure.     Patient verbalized understanding and had no questions or concerns at this time.    Lakisha Apple RN

## 2022-04-06 ENCOUNTER — ANESTHESIA (OUTPATIENT)
Dept: SURGERY | Facility: AMBULATORY SURGERY CENTER | Age: 47
End: 2022-04-06
Payer: COMMERCIAL

## 2022-04-06 ENCOUNTER — HOSPITAL ENCOUNTER (OUTPATIENT)
Facility: AMBULATORY SURGERY CENTER | Age: 47
Discharge: HOME | End: 2022-04-06
Attending: INTERNAL MEDICINE
Payer: COMMERCIAL

## 2022-04-06 VITALS
SYSTOLIC BLOOD PRESSURE: 114 MMHG | BODY MASS INDEX: 28.17 KG/M2 | RESPIRATION RATE: 14 BRPM | TEMPERATURE: 97.4 F | DIASTOLIC BLOOD PRESSURE: 69 MMHG | OXYGEN SATURATION: 97 % | HEIGHT: 64 IN | WEIGHT: 165 LBS | HEART RATE: 64 BPM

## 2022-04-06 VITALS — HEART RATE: 72 BPM

## 2022-04-06 LAB
COLONOSCOPY: NORMAL
HCG UR QL: NEGATIVE
INTERNAL QC OK POCT: NORMAL
POCT KIT EXPIRATION DATE: NORMAL
POCT KIT LOT NUMBER: NORMAL

## 2022-04-06 PROCEDURE — 88305 TISSUE EXAM BY PATHOLOGIST: CPT | Mod: 26 | Performed by: PATHOLOGY

## 2022-04-06 PROCEDURE — 81025 URINE PREGNANCY TEST: CPT | Performed by: PATHOLOGY

## 2022-04-06 PROCEDURE — 45380 COLONOSCOPY AND BIOPSY: CPT | Mod: 59,33

## 2022-04-06 PROCEDURE — 88305 TISSUE EXAM BY PATHOLOGIST: CPT | Mod: TC | Performed by: INTERNAL MEDICINE

## 2022-04-06 PROCEDURE — 45385 COLONOSCOPY W/LESION REMOVAL: CPT | Mod: 33

## 2022-04-06 RX ORDER — OXYCODONE HYDROCHLORIDE 5 MG/1
5 TABLET ORAL EVERY 4 HOURS PRN
Status: DISCONTINUED | OUTPATIENT
Start: 2022-04-06 | End: 2022-04-07 | Stop reason: HOSPADM

## 2022-04-06 RX ORDER — SODIUM CHLORIDE, SODIUM LACTATE, POTASSIUM CHLORIDE, CALCIUM CHLORIDE 600; 310; 30; 20 MG/100ML; MG/100ML; MG/100ML; MG/100ML
INJECTION, SOLUTION INTRAVENOUS CONTINUOUS
Status: DISCONTINUED | OUTPATIENT
Start: 2022-04-06 | End: 2022-04-07 | Stop reason: HOSPADM

## 2022-04-06 RX ORDER — ONDANSETRON 4 MG/1
4 TABLET, ORALLY DISINTEGRATING ORAL EVERY 30 MIN PRN
Status: DISCONTINUED | OUTPATIENT
Start: 2022-04-06 | End: 2022-04-07 | Stop reason: HOSPADM

## 2022-04-06 RX ORDER — PROPOFOL 10 MG/ML
INJECTION, EMULSION INTRAVENOUS PRN
Status: DISCONTINUED | OUTPATIENT
Start: 2022-04-06 | End: 2022-04-06

## 2022-04-06 RX ORDER — SIMETHICONE
LIQUID (ML) MISCELLANEOUS PRN
Status: DISCONTINUED | OUTPATIENT
Start: 2022-04-06 | End: 2022-04-06 | Stop reason: HOSPADM

## 2022-04-06 RX ORDER — ONDANSETRON 2 MG/ML
4 INJECTION INTRAMUSCULAR; INTRAVENOUS EVERY 30 MIN PRN
Status: DISCONTINUED | OUTPATIENT
Start: 2022-04-06 | End: 2022-04-07 | Stop reason: HOSPADM

## 2022-04-06 RX ORDER — LIDOCAINE 40 MG/G
CREAM TOPICAL
Status: DISCONTINUED | OUTPATIENT
Start: 2022-04-06 | End: 2022-04-07 | Stop reason: HOSPADM

## 2022-04-06 RX ORDER — LIDOCAINE HYDROCHLORIDE 20 MG/ML
INJECTION, SOLUTION INFILTRATION; PERINEURAL PRN
Status: DISCONTINUED | OUTPATIENT
Start: 2022-04-06 | End: 2022-04-06

## 2022-04-06 RX ORDER — MEPERIDINE HYDROCHLORIDE 25 MG/ML
12.5 INJECTION INTRAMUSCULAR; INTRAVENOUS; SUBCUTANEOUS
Status: DISCONTINUED | OUTPATIENT
Start: 2022-04-06 | End: 2022-04-07 | Stop reason: HOSPADM

## 2022-04-06 RX ORDER — ONDANSETRON 2 MG/ML
4 INJECTION INTRAMUSCULAR; INTRAVENOUS
Status: COMPLETED | OUTPATIENT
Start: 2022-04-06 | End: 2022-04-06

## 2022-04-06 RX ORDER — PROPOFOL 10 MG/ML
INJECTION, EMULSION INTRAVENOUS CONTINUOUS PRN
Status: DISCONTINUED | OUTPATIENT
Start: 2022-04-06 | End: 2022-04-06

## 2022-04-06 RX ADMIN — ONDANSETRON 4 MG: 2 INJECTION INTRAMUSCULAR; INTRAVENOUS at 07:23

## 2022-04-06 RX ADMIN — LIDOCAINE HYDROCHLORIDE 50 MG: 20 INJECTION, SOLUTION INFILTRATION; PERINEURAL at 07:23

## 2022-04-06 RX ADMIN — PROPOFOL 150 MCG/KG/MIN: 10 INJECTION, EMULSION INTRAVENOUS at 07:23

## 2022-04-06 RX ADMIN — PROPOFOL 50 MG: 10 INJECTION, EMULSION INTRAVENOUS at 07:49

## 2022-04-06 RX ADMIN — SODIUM CHLORIDE, SODIUM LACTATE, POTASSIUM CHLORIDE, CALCIUM CHLORIDE: 600; 310; 30; 20 INJECTION, SOLUTION INTRAVENOUS at 07:04

## 2022-04-06 RX ADMIN — PROPOFOL 50 MG: 10 INJECTION, EMULSION INTRAVENOUS at 07:23

## 2022-04-06 ASSESSMENT — LIFESTYLE VARIABLES: TOBACCO_USE: 0

## 2022-04-06 ASSESSMENT — COPD QUESTIONNAIRES: COPD: 0

## 2022-04-06 ASSESSMENT — ENCOUNTER SYMPTOMS: ORTHOPNEA: 0

## 2022-04-06 NOTE — ANESTHESIA PREPROCEDURE EVALUATION
Anesthesia Pre-Procedure Evaluation    Patient: Belkis Butler   MRN: [id number] : 1975        Procedure : Procedure(s):  COLONOSCOPY, WITH POLYPECTOMY, WITH CLIP          Past Medical History:   Diagnosis Date     PCOS (polycystic ovarian syndrome)       Past Surgical History:   Procedure Laterality Date     COMBINED CYSTOSCOPY, RETROGRADES, URETEROSCOPY, LASER HOLMIUM LITHOTRIPSY URETER(S), INSERT STENT Left 2022    Procedure: CYSTOURETEROSCOPY, WITH RETROGRADE PYELOGRAM, HOLMIUM LASER LITHOTRIPSY, LEFT STENT INSERTION;  Surgeon: Juan Abebe MD;  [place] [facility] [place]     D & C        Allergies   Allergen Reactions     Penicillins       Social History     Tobacco Use     Smoking status: Never Smoker     Smokeless tobacco: Never Used   Substance Use Topics     Alcohol use: Yes     Comment: A glass of wine per week      Wt Readings from Last 1 Encounters:   22 74.8 kg (165 lb)        Anesthesia Evaluation   Pt has had prior anesthetic. Type: General.    No history of anesthetic complications       ROS/MED HX  ENT/Pulmonary:    (-) tobacco use, asthma, COPD and recent URI   Neurologic:       Cardiovascular:    (-) CARTER, orthopnea/PND and syncope   METS/Exercise Tolerance: >4 METS    Hematologic:       Musculoskeletal:       GI/Hepatic:    (-) GERD   Renal/Genitourinary:    (-) renal disease   Endo:       Psychiatric/Substance Use:       Infectious Disease:       Malignancy:       Other:     (-) Any chance pregnant       Physical Exam    Airway        Mallampati: I   TM distance: > 3 FB   Neck ROM: full   Mouth opening: > 3 cm    Respiratory Devices and Support         Dental  no notable dental history         Cardiovascular          Rhythm and rate: regular and normal     Pulmonary           breath sounds clear to auscultation           OUTSIDE LABS:  CBC:   Lab Results   Component Value Date    WBC 11.5 (H) 2022    WBC 6.2 2016    HGB 14.6 2022    HGB 14.3 2016     HCT 43.2 01/16/2022    HCT 42.7 09/12/2016     01/16/2022     09/12/2016     BMP:   Lab Results   Component Value Date     01/16/2022     09/12/2016    POTASSIUM 3.9 01/16/2022    POTASSIUM 4.0 09/12/2016    CHLORIDE 109 01/16/2022    CHLORIDE 107 09/12/2016    CO2 21 01/16/2022    CO2 24 09/12/2016    BUN 20 01/16/2022    BUN 14 09/12/2016    CR 1.12 (H) 01/16/2022    CR 0.83 09/12/2016     (H) 01/16/2022    GLC 93 09/12/2016     COAGS: No results found for: PTT, INR, FIBR  POC:   Lab Results   Component Value Date    HCG Negative 04/06/2022     HEPATIC:   Lab Results   Component Value Date    ALBUMIN 4.1 01/16/2022    PROTTOTAL 7.6 01/16/2022    ALT 18 01/16/2022    AST 15 01/16/2022    ALKPHOS 62 01/16/2022    BILITOTAL 1.0 01/16/2022     OTHER:   Lab Results   Component Value Date    A1C 5.6 09/12/2016    MAGGIE 9.2 01/16/2022    LIPASE 118 01/16/2022    TSH 3.78 09/12/2016    T4 1.09 09/12/2016       Anesthesia Plan    ASA Status:  1   NPO Status:  NPO Appropriate    Anesthesia Type: MAC.     - Reason for MAC: straight local not clinically adequate              Consents    Anesthesia Plan(s) and associated risks, benefits, and realistic alternatives discussed. Questions answered and patient/representative(s) expressed understanding.    - Discussed:     - Discussed with:  Patient         Postoperative Care            Comments:    Other Comments: Discussed plan for MAC, including possibility of awareness, risk of aspiration pneumonia, risk of hypoxia/low oxygen/airway obstruction requiring additional airway support/devices. Discussed alternatives. Discussed backup plan of general anesthesia and risks of general anesthesia, including sore throat/hoarse voice, abrasions/damage to lips/tongue/teeth, nausea, rare complications (including medication reactions, cardiac, pulmonary, recall). Ensured understanding, invited questions and all questions were answered.       H&P reviewed: Unable  to attach H&P to encounter due to EHR limitations. H&P Update: appropriate H&P reviewed, patient examined. No interval changes since H&P (within 30 days).         Yamilet Ocasio MD

## 2022-04-06 NOTE — ANESTHESIA CARE TRANSFER NOTE
Patient: Belksi Butler    Procedure: Procedure(s):  COLONOSCOPY, WITH POLYPECTOMY, WITH CLIP       Diagnosis: Screening for colon cancer [Z12.11]  Diagnosis Additional Information: No value filed.    Anesthesia Type:   No value filed.     Note:    Oropharynx: oropharynx clear of all foreign objects  Level of Consciousness: awake  Oxygen Supplementation: room air    Independent Airway: airway patency satisfactory and stable  Dentition: dentition unchanged  Vital Signs Stable: post-procedure vital signs reviewed and stable  Report to RN Given: handoff report given  Patient transferred to: Phase II    Handoff Report: Identifed the Patient, Identified the Reponsible Provider, Reviewed the pertinent medical history, Discussed the surgical course, Reviewed Intra-OP anesthesia mangement and issues during anesthesia, Set expectations for post-procedure period and Allowed opportunity for questions and acknowledgement of understanding      Vitals:  Vitals Value Taken Time   BP     Temp 35.8  C (96.4  F) 04/06/22 0757   Pulse 86 04/06/22 0757   Resp 16 04/06/22 0757   SpO2 97 % 04/06/22 0757       Electronically Signed By: FILOMENA Jones CRNA  April 6, 2022  7:58 AM

## 2022-04-06 NOTE — ANESTHESIA POSTPROCEDURE EVALUATION
Patient: Belkis Butler    Procedure: Procedure(s):  COLONOSCOPY, WITH POLYPECTOMY, WITH CLIP       Anesthesia Type:  MAC    Note:  Disposition: Outpatient   Postop Pain Control: Uneventful            Sign Out: Well controlled pain   PONV: No   Neuro/Psych: Uneventful            Sign Out: Acceptable/Baseline neuro status   Airway/Respiratory: Uneventful            Sign Out: Acceptable/Baseline resp. status   CV/Hemodynamics: Uneventful            Sign Out: Acceptable CV status; No obvious hypovolemia; No obvious fluid overload   Other NRE: NONE   DID A NON-ROUTINE EVENT OCCUR? No           Last vitals:  Vitals Value Taken Time   /69 04/06/22 0812   Temp 36.3  C (97.4  F) 04/06/22 0812   Pulse 64 04/06/22 0812   Resp 14 04/06/22 0812   SpO2 97 % 04/06/22 0757       Electronically Signed By: Yamilet Ocasio MD  April 6, 2022  11:22 AM

## 2022-04-06 NOTE — H&P
"Belkis Butler  [id number]  female  47 year old      Reason for procedure/surgery: Screening    Patient Active Problem List   Diagnosis     Ureteral stone       Past Surgical History:    Past Surgical History:   Procedure Laterality Date     COMBINED CYSTOSCOPY, RETROGRADES, URETEROSCOPY, LASER HOLMIUM LITHOTRIPSY URETER(S), INSERT STENT Left 1/19/2022    Procedure: CYSTOURETEROSCOPY, WITH RETROGRADE PYELOGRAM, HOLMIUM LASER LITHOTRIPSY, LEFT STENT INSERTION;  Surgeon: Juan Abebe MD;  [place] [facility] [place] & C         Past Medical History:   Past Medical History:   Diagnosis Date     PCOS (polycystic ovarian syndrome)        Social History:   Social History     Tobacco Use     Smoking status: Never Smoker     Smokeless tobacco: Never Used   Substance Use Topics     Alcohol use: Yes     Comment: A glass of wine per week       Family History:   Family History   Problem Relation Age of Onset     Asthma Sister      Allergies Mother      Allergies Sister      Cancer Paternal Grandfather         lung     Ovarian Cancer Paternal Grandmother      Cancer Maternal Grandmother         leukemia     Heart Disease Maternal Grandmother      Heart Disease Maternal Grandfather        Allergies:   Allergies   Allergen Reactions     Penicillins        Active Medications:   Current Outpatient Medications   Medication Sig Dispense Refill     cetirizine (ZYRTEC) 10 MG tablet Take 10 mg by mouth daily       ibuprofen (ADVIL/MOTRIN) 200 MG tablet Take 400 mg by mouth every 4 hours as needed for mild pain         Systemic Review:   CONSTITUTIONAL: NEGATIVE for fever, chills, change in weight  ENT/MOUTH: NEGATIVE for ear, mouth and throat problems  RESP: NEGATIVE for significant cough or SOB  CV: NEGATIVE for chest pain, palpitations or peripheral edema    Physical Examination:   Vital Signs: BP (!) 127/90 (BP Location: Right arm)   Pulse 79   Temp 97.3  F (36.3  C) (Temporal)   Resp 18   Ht 1.626 m (5' 4\")   Wt 74.8 kg " (165 lb)   SpO2 97%   BMI 28.32 kg/m    GENERAL: healthy, alert and no distress  NECK: no adenopathy, no asymmetry, masses, or scars  RESP: lungs clear to auscultation - no rales, rhonchi or wheezes  CV: regular rate and rhythm, normal S1 S2, no S3 or S4, no murmur, click or rub, no peripheral edema and peripheral pulses strong  ABDOMEN: soft, nontender, no hepatosplenomegaly, no masses and bowel sounds normal  MS: no gross musculoskeletal defects noted, no edema    Plan: Appropriate to proceed as scheduled.      Dominic Quinteros MD  4/6/2022    PCP:  Poppy Pascual

## 2022-04-07 LAB
PATH REPORT.COMMENTS IMP SPEC: NORMAL
PATH REPORT.FINAL DX SPEC: NORMAL
PATH REPORT.GROSS SPEC: NORMAL
PATH REPORT.MICROSCOPIC SPEC OTHER STN: NORMAL
PATH REPORT.RELEVANT HX SPEC: NORMAL
PHOTO IMAGE: NORMAL

## 2022-05-15 ENCOUNTER — HEALTH MAINTENANCE LETTER (OUTPATIENT)
Age: 47
End: 2022-05-15

## 2022-09-11 ENCOUNTER — HEALTH MAINTENANCE LETTER (OUTPATIENT)
Age: 47
End: 2022-09-11

## 2022-10-29 ENCOUNTER — HOSPITAL ENCOUNTER (EMERGENCY)
Facility: CLINIC | Age: 47
Discharge: HOME | End: 2022-10-29
Admitting: NURSE PRACTITIONER
Payer: COMMERCIAL

## 2022-10-29 VITALS
DIASTOLIC BLOOD PRESSURE: 90 MMHG | HEIGHT: 64 IN | WEIGHT: 170 LBS | SYSTOLIC BLOOD PRESSURE: 130 MMHG | BODY MASS INDEX: 29.02 KG/M2 | RESPIRATION RATE: 18 BRPM | HEART RATE: 97 BPM | TEMPERATURE: 99.3 F | OXYGEN SATURATION: 98 %

## 2022-10-29 DIAGNOSIS — U07.1 INFECTION DUE TO 2019 NOVEL CORONAVIRUS: ICD-10-CM

## 2022-10-29 LAB
ALBUMIN SERPL BCG-MCNC: 4.4 G/DL (ref 3.5–5.2)
ALP SERPL-CCNC: 57 U/L (ref 35–104)
ALT SERPL W P-5'-P-CCNC: 21 U/L (ref 10–35)
ANION GAP SERPL CALCULATED.3IONS-SCNC: 14 MMOL/L (ref 7–15)
AST SERPL W P-5'-P-CCNC: 22 U/L (ref 10–35)
BASOPHILS # BLD AUTO: 0 10E3/UL (ref 0–0.2)
BASOPHILS NFR BLD AUTO: 0 %
BILIRUB SERPL-MCNC: 0.4 MG/DL
BUN SERPL-MCNC: 14.8 MG/DL (ref 6–20)
CALCIUM SERPL-MCNC: 9.7 MG/DL (ref 8.6–10)
CHLORIDE SERPL-SCNC: 101 MMOL/L (ref 98–107)
CREAT SERPL-MCNC: 0.77 MG/DL (ref 0.51–0.95)
DEPRECATED HCO3 PLAS-SCNC: 19 MMOL/L (ref 22–29)
EOSINOPHIL # BLD AUTO: 0 10E3/UL (ref 0–0.7)
EOSINOPHIL NFR BLD AUTO: 1 %
ERYTHROCYTE [DISTWIDTH] IN BLOOD BY AUTOMATED COUNT: 12.2 % (ref 10–15)
FLUAV RNA SPEC QL NAA+PROBE: NEGATIVE
FLUBV RNA RESP QL NAA+PROBE: NEGATIVE
GFR SERPL CREATININE-BSD FRML MDRD: >90 ML/MIN/1.73M2
GLUCOSE SERPL-MCNC: 120 MG/DL (ref 70–99)
HCT VFR BLD AUTO: 40.2 % (ref 35–47)
HGB BLD-MCNC: 13.9 G/DL (ref 11.7–15.7)
HOLD SPECIMEN: NORMAL
IMM GRANULOCYTES # BLD: 0 10E3/UL
IMM GRANULOCYTES NFR BLD: 0 %
INR PPP: 1.05 (ref 0.85–1.15)
LYMPHOCYTES # BLD AUTO: 0.3 10E3/UL (ref 0.8–5.3)
LYMPHOCYTES NFR BLD AUTO: 4 %
MCH RBC QN AUTO: 29.4 PG (ref 26.5–33)
MCHC RBC AUTO-ENTMCNC: 34.6 G/DL (ref 31.5–36.5)
MCV RBC AUTO: 85 FL (ref 78–100)
MONOCYTES # BLD AUTO: 0.6 10E3/UL (ref 0–1.3)
MONOCYTES NFR BLD AUTO: 9 %
NEUTROPHILS # BLD AUTO: 6.1 10E3/UL (ref 1.6–8.3)
NEUTROPHILS NFR BLD AUTO: 86 %
NRBC # BLD AUTO: 0 10E3/UL
NRBC BLD AUTO-RTO: 0 /100
PLATELET # BLD AUTO: 241 10E3/UL (ref 150–450)
POTASSIUM SERPL-SCNC: 3.7 MMOL/L (ref 3.4–5.3)
PROT SERPL-MCNC: 6.9 G/DL (ref 6.4–8.3)
RBC # BLD AUTO: 4.73 10E6/UL (ref 3.8–5.2)
RSV RNA SPEC NAA+PROBE: NEGATIVE
SARS-COV-2 RNA RESP QL NAA+PROBE: POSITIVE
SODIUM SERPL-SCNC: 134 MMOL/L (ref 136–145)
TROPONIN T SERPL HS-MCNC: <6 NG/L
TSH SERPL DL<=0.005 MIU/L-ACNC: 1.09 UIU/ML (ref 0.3–4.2)
WBC # BLD AUTO: 7.1 10E3/UL (ref 4–11)

## 2022-10-29 PROCEDURE — 84484 ASSAY OF TROPONIN QUANT: CPT | Performed by: EMERGENCY MEDICINE

## 2022-10-29 PROCEDURE — 99284 EMERGENCY DEPT VISIT MOD MDM: CPT | Mod: CS | Performed by: NURSE PRACTITIONER

## 2022-10-29 PROCEDURE — 250N000013 HC RX MED GY IP 250 OP 250 PS 637: Performed by: NURSE PRACTITIONER

## 2022-10-29 PROCEDURE — 99284 EMERGENCY DEPT VISIT MOD MDM: CPT | Mod: CS,25 | Performed by: NURSE PRACTITIONER

## 2022-10-29 PROCEDURE — 93010 ELECTROCARDIOGRAM REPORT: CPT | Performed by: NURSE PRACTITIONER

## 2022-10-29 PROCEDURE — C9803 HOPD COVID-19 SPEC COLLECT: HCPCS | Performed by: NURSE PRACTITIONER

## 2022-10-29 PROCEDURE — 96361 HYDRATE IV INFUSION ADD-ON: CPT | Performed by: NURSE PRACTITIONER

## 2022-10-29 PROCEDURE — 85610 PROTHROMBIN TIME: CPT | Performed by: EMERGENCY MEDICINE

## 2022-10-29 PROCEDURE — 93005 ELECTROCARDIOGRAM TRACING: CPT | Performed by: NURSE PRACTITIONER

## 2022-10-29 PROCEDURE — 85025 COMPLETE CBC W/AUTO DIFF WBC: CPT | Performed by: EMERGENCY MEDICINE

## 2022-10-29 PROCEDURE — 87637 SARSCOV2&INF A&B&RSV AMP PRB: CPT | Performed by: EMERGENCY MEDICINE

## 2022-10-29 PROCEDURE — 96360 HYDRATION IV INFUSION INIT: CPT | Performed by: NURSE PRACTITIONER

## 2022-10-29 PROCEDURE — 84443 ASSAY THYROID STIM HORMONE: CPT | Performed by: EMERGENCY MEDICINE

## 2022-10-29 PROCEDURE — 36415 COLL VENOUS BLD VENIPUNCTURE: CPT | Performed by: EMERGENCY MEDICINE

## 2022-10-29 PROCEDURE — 80053 COMPREHEN METABOLIC PANEL: CPT | Performed by: EMERGENCY MEDICINE

## 2022-10-29 PROCEDURE — 258N000003 HC RX IP 258 OP 636: Performed by: NURSE PRACTITIONER

## 2022-10-29 RX ORDER — ACETAMINOPHEN 500 MG
1000 TABLET ORAL ONCE
Status: DISCONTINUED | OUTPATIENT
Start: 2022-10-29 | End: 2022-10-29

## 2022-10-29 RX ORDER — ACETAMINOPHEN 500 MG
1000 TABLET ORAL ONCE
Status: COMPLETED | OUTPATIENT
Start: 2022-10-29 | End: 2022-10-29

## 2022-10-29 RX ORDER — SODIUM CHLORIDE 9 MG/ML
INJECTION, SOLUTION INTRAVENOUS CONTINUOUS
Status: DISCONTINUED | OUTPATIENT
Start: 2022-10-29 | End: 2022-10-30 | Stop reason: HOSPADM

## 2022-10-29 RX ADMIN — ACETAMINOPHEN 1000 MG: 500 TABLET ORAL at 20:00

## 2022-10-29 RX ADMIN — SODIUM CHLORIDE 1000 ML: 9 INJECTION, SOLUTION INTRAVENOUS at 20:00

## 2022-10-29 ASSESSMENT — ENCOUNTER SYMPTOMS
ALLERGIC/IMMUNOLOGIC NEGATIVE: 1
SHORTNESS OF BREATH: 0
HEADACHES: 1
CHEST TIGHTNESS: 0
RESPIRATORY NEGATIVE: 1
SINUS PRESSURE: 1
FATIGUE: 1
RHINORRHEA: 1
GASTROINTESTINAL NEGATIVE: 1
ENDOCRINE NEGATIVE: 1
CHILLS: 1
HEMATOLOGIC/LYMPHATIC NEGATIVE: 1
PSYCHIATRIC NEGATIVE: 1
COUGH: 0
EYES NEGATIVE: 1
PALPITATIONS: 0
CARDIOVASCULAR NEGATIVE: 1
WHEEZING: 0
MUSCULOSKELETAL NEGATIVE: 1

## 2022-10-29 ASSESSMENT — ACTIVITIES OF DAILY LIVING (ADL)
ADLS_ACUITY_SCORE: 35
ADLS_ACUITY_SCORE: 35

## 2022-10-29 NOTE — ED TRIAGE NOTES
"Triage Assessment & Note:    BP (!) 168/101   Pulse (!) 136   Temp 99.9  F (37.7  C) (Temporal)   Resp 18   Ht 1.626 m (5' 4\")   Wt 77.1 kg (170 lb)   SpO2 96%   BMI 29.18 kg/m      Patient presents with: PT c/o elevated HR. PT reports her father  of covid 3 weeks ago and now her daughter has it and she is concerned that she has covid. PT is anxious, denies CP and SOB does endorse a slight cough. PT is noted to be tachy in triage.     Home Treatments/Remedies: None    Febrile / Afebrile? Afebrile     Duration of C/o:  6 hrs     Gustavo Vazquez RN  2022         Triage Assessment     Row Name 10/29/22 6545       Triage Assessment (Adult)    Airway WDL WDL       Respiratory WDL    Respiratory WDL WDL       Cardiac WDL    Cardiac WDL WDL              "

## 2022-10-30 ENCOUNTER — PATIENT OUTREACH (OUTPATIENT)
Dept: CARE COORDINATION | Facility: CLINIC | Age: 47
End: 2022-10-30

## 2022-10-30 LAB
ATRIAL RATE - MUSE: 115 BPM
DIASTOLIC BLOOD PRESSURE - MUSE: NORMAL MMHG
INTERPRETATION ECG - MUSE: NORMAL
P AXIS - MUSE: NORMAL DEGREES
PR INTERVAL - MUSE: 120 MS
QRS DURATION - MUSE: 96 MS
QT - MUSE: 452 MS
QTC - MUSE: 625 MS
R AXIS - MUSE: 28 DEGREES
SYSTOLIC BLOOD PRESSURE - MUSE: NORMAL MMHG
T AXIS - MUSE: 41 DEGREES
VENTRICULAR RATE- MUSE: 115 BPM

## 2022-10-30 NOTE — PROGRESS NOTES
Clinic Care Coordination Contact    Referral Type: Virtual Home Monitoring - [facility]Well [facility] Program    Patient referred for Virtual Home Monitoring Program for either COVID-19 or Community Acquired Pneumonia diagnosis following recent [facility] ED visit.  GetWell [facility] referral is in place.    Criteria for Virtual Home Monitoring telephone outreach is not met after review of ED encounter/ED provider note because:    1) ED provider note indicates assessment was negative for respiratory distress. O2 sats were stable throughout course of ED visit per chart review.      2) Patient was not discharged with new supplemental oxygen.    Per notes, ED provider and/or ED care team discussed appropriate follow up guidelines with patient prior to discharge or reflected these instructions on AVS.       GetWell [facility] team remains available to support patient via GetWell Loop account once activated by patient.     Matilde Hi RN  [facility]  - RN Care Coordinator

## 2022-10-30 NOTE — DISCHARGE INSTRUCTIONS
Take Paxlovid as prescribed.  If your symptoms improve while on Paxlovid then return after course of Paxlovid is done, you likely have a rebound infection and will need to start your quarantine over.     Otherwise for symptom management needs Tylenol and/or ibuprofen for pain, headache, fever.  Afrin nasal spray works very well for congestion, however do not take this for more than 3 days as using longer may cause rebound congestion.     If you develop any chest pain, shortness of breath, confusion, vomiting, or any other symptoms that are concerning to you please return to the emergency department.    Pharmacy Information: Only the following pharmacies can be used for prescriptions of oral Covid treatments - Please ask the patient to CALL AHEAD for availability             [facility] ([phone]) (M-F 8-6, Sat/Sun 8-4)            [facility] ([phone]) (M-F 7-7)            [facility] ([phone]) (M-F 8-6, Sat/Sun 8-4)            [facility] ([phone]) (M-F 8:30-6, Sat/Sun 8:30-12:30)            [facility] ([phone]) (M-F 8-9, Sat/Sun 9-5)            [facility] ([phone]) (M-F 8:30-5, Sat/Sun 9-1)            [facility] [employer] ([phone]) (M-F 8-8:30, Sat 9-5, Sun 9-4)            [facility] [place] ([phone]) (M-Th 8-5:30, Fri 8-5)

## 2022-10-30 NOTE — ED PROVIDER NOTES
ED Provider Note  [facility]      History     Chief Complaint   Patient presents with     Tachycardia     HPI  Belkis Butler is a 47 year old female with a PMH of hypertension, not currently controlled on medications who presents to the emergency department concern for COVID.  Patient states her father passed away from an MI related to COVID-19 infection 3 weeks ago, her daughter recently tested positive for COVID-19, her symptoms of runny nose and headache started yesterday.  She denies any chest pain, pressure, tightness, shortness of breath, cough, nausea, vomiting, diarrhea.  He does continue to endorse headache and runny nose.  States that earlier today she was feeling unwell so took a nap, her Apple Watch alerted her that her heart rate was elevated in the 140s to 160s.  Patient is uncertain if she is just feeling anxious or if this is related to COVID.    Past Medical History  Past Medical History:   Diagnosis Date     PCOS (polycystic ovarian syndrome)      Past Surgical History:   Procedure Laterality Date     COLONOSCOPY N/A 4/6/2022    Procedure: COLONOSCOPY, WITH POLYPECTOMY, WITH CLIP;  Surgeon: Dominic Quinteros MD;  [place] [facility] [place]     COMBINED CYSTOSCOPY, RETROGRADES, URETEROSCOPY, LASER HOLMIUM LITHOTRIPSY URETER(S), INSERT STENT Left 1/19/2022    Procedure: CYSTOURETEROSCOPY, WITH RETROGRADE PYELOGRAM, HOLMIUM LASER LITHOTRIPSY, LEFT STENT INSERTION;  Surgeon: Juan Abebe MD;  Location: [facility] [place]     D & C       nirmatrelvir and ritonavir (PAXLOVID) therapy pack  cetirizine (ZYRTEC) 10 MG tablet  ibuprofen (ADVIL/MOTRIN) 200 MG tablet      Allergies   Allergen Reactions     Penicillins      Family History  Family History   Problem Relation Age of Onset     Asthma Sister      Allergies Mother      Allergies Sister      Cancer Paternal Grandfather         lung     Ovarian Cancer Paternal Grandmother      Cancer Maternal Grandmother         leukemia      "Heart Disease Maternal Grandmother      Heart Disease Maternal Grandfather      Social History   Social History     Tobacco Use     Smoking status: Never     Smokeless tobacco: Never   Substance Use Topics     Alcohol use: Yes     Comment: A glass of wine per week     Drug use: No      Past medical history, past surgical history, medications, allergies, family history, and social history were reviewed with the patient. No additional pertinent items.       Review of Systems   Constitutional: Positive for chills and fatigue.   HENT: Positive for congestion, rhinorrhea and sinus pressure.    Eyes: Negative.    Respiratory: Negative.  Negative for cough, chest tightness, shortness of breath and wheezing.    Cardiovascular: Negative.  Negative for chest pain and palpitations.   Gastrointestinal: Negative.    Endocrine: Negative.    Genitourinary: Negative.    Musculoskeletal: Negative.    Skin: Negative.    Allergic/Immunologic: Negative.    Neurological: Positive for headaches.   Hematological: Negative.    Psychiatric/Behavioral: Negative.      A complete review of systems was performed with pertinent positives and negatives noted in the HPI, and all other systems negative.    Physical Exam   BP: (!) 168/101  Pulse: (!) 136  Temp: 99.9  F (37.7  C)  Resp: 18  Height: 162.6 cm (5' 4\")  Weight: 77.1 kg (170 lb)  SpO2: 96 %  Physical Exam  Vitals and nursing note reviewed.   Constitutional:       Comments: Anxious apperaing   HENT:      Head: Normocephalic and atraumatic.   Cardiovascular:      Rate and Rhythm: Tachycardia present.      Pulses: Normal pulses.      Heart sounds: Normal heart sounds.      Comments: Hypertensive  Pulmonary:      Breath sounds: Normal breath sounds.   Abdominal:      General: Abdomen is flat.      Palpations: Abdomen is soft.   Musculoskeletal:         General: Normal range of motion.      Cervical back: Normal range of motion and neck supple.   Skin:     General: Skin is warm and dry.      " Capillary Refill: Capillary refill takes less than 2 seconds.   Neurological:      General: No focal deficit present.      Mental Status: She is alert and oriented to person, place, and time.   Psychiatric:         Mood and Affect: Mood normal.         Behavior: Behavior normal.         ED Course     ED Course as of 10/29/22 1956   Sat Oct 29, 2022   1926 SARS CoV2 PCR(!): Positive   1936 Sodium(!): 134     Procedures            EKG Interpretation:      Interpreted by FILOMENA Vera CNP  Time reviewed: 1815  Symptoms at time of EKG: tachycardia   Rhythm: sinus tachycardia  Rate: Tachycardia  Axis: Normal  Ectopy: none  Conduction: normal  ST Segments/ T Waves: QTc prolongation 625  Q Waves: none  Comparison to prior: No old EKG available    Clinical Impression: abnormal EKG, sinus tachycardia, prolonged QT        Results for orders placed or performed during the hospital encounter of 10/29/22   Comprehensive metabolic panel     Status: Abnormal   Result Value Ref Range    Sodium 134 (L) 136 - 145 mmol/L    Potassium 3.7 3.4 - 5.3 mmol/L    Chloride 101 98 - 107 mmol/L    Carbon Dioxide (CO2) 19 (L) 22 - 29 mmol/L    Anion Gap 14 7 - 15 mmol/L    Urea Nitrogen 14.8 6.0 - 20.0 mg/dL    Creatinine 0.77 0.51 - 0.95 mg/dL    Calcium 9.7 8.6 - 10.0 mg/dL    Glucose 120 (H) 70 - 99 mg/dL    Alkaline Phosphatase 57 35 - 104 U/L    AST 22 10 - 35 U/L    ALT 21 10 - 35 U/L    Protein Total 6.9 6.4 - 8.3 g/dL    Albumin 4.4 3.5 - 5.2 g/dL    Bilirubin Total 0.4 <=1.2 mg/dL    GFR Estimate >90 >60 mL/min/1.73m2   Troponin T, High Sensitivity     Status: Normal   Result Value Ref Range    Troponin T, High Sensitivity <6 <=14 ng/L   TSH with free T4 reflex     Status: Normal   Result Value Ref Range    TSH 1.09 0.30 - 4.20 uIU/mL   INR     Status: Normal   Result Value Ref Range    INR 1.05 0.85 - 1.15   Symptomatic; Unknown Influenza A/B & SARS-CoV2 (COVID-19) Virus PCR Multiplex Nasopharyngeal     Status: Abnormal     Specimen: Nasopharyngeal; Swab   Result Value Ref Range    Influenza A PCR Negative Negative    Influenza B PCR Negative Negative    RSV PCR Negative Negative    SARS CoV2 PCR Positive (A) Negative    Narrative    Testing was performed using the Xpert Xpress CoV2/Flu/RSV Assay on the Cepheid GeneXpert Instrument. This test should be ordered for the detection of SARS-CoV-2 and influenza viruses in individuals who meet clinical and/or epidemiological criteria. Test performance is unknown in asymptomatic patients. This test is for in vitro diagnostic use under the FDA EUA for laboratories certified under CLIA to perform high or moderate complexity testing. This test has not been FDA cleared or approved. A negative result does not rule out the presence of PCR inhibitors in the specimen or target RNA in concentration below the limit of detection for the assay. If only one viral target is positive but coinfection with multiple targets is suspected, the sample should be re-tested with another FDA cleared, approved, or authorized test, if coinfection would change clinical management. This test was validated by the [facility] [employer]. These laboratories are certified under the Clinical Laboratory Improvement Amendments of 1988 (CLIA-88) as qualified to perform high complexity laboratory testing.   CBC with platelets and differential     Status: Abnormal   Result Value Ref Range    WBC Count 7.1 4.0 - 11.0 10e3/uL    RBC Count 4.73 3.80 - 5.20 10e6/uL    Hemoglobin 13.9 11.7 - 15.7 g/dL    Hematocrit 40.2 35.0 - 47.0 %    MCV 85 78 - 100 fL    MCH 29.4 26.5 - 33.0 pg    MCHC 34.6 31.5 - 36.5 g/dL    RDW 12.2 10.0 - 15.0 %    Platelet Count 241 150 - 450 10e3/uL    % Neutrophils 86 %    % Lymphocytes 4 %    % Monocytes 9 %    % Eosinophils 1 %    % Basophils 0 %    % Immature Granulocytes 0 %    NRBCs per 100 WBC 0 <1 /100    Absolute Neutrophils 6.1 1.6 - 8.3 10e3/uL    Absolute Lymphocytes 0.3 (L) 0.8 - 5.3  10e3/uL    Absolute Monocytes 0.6 0.0 - 1.3 10e3/uL    Absolute Eosinophils 0.0 0.0 - 0.7 10e3/uL    Absolute Basophils 0.0 0.0 - 0.2 10e3/uL    Absolute Immature Granulocytes 0.0 <=0.4 10e3/uL    Absolute NRBCs 0.0 10e3/uL   Extra Tube     Status: None    Narrative    The following orders were created for panel order Extra Tube.  Procedure                               Abnormality         Status                     ---------                               -----------         ------                     Extra Red Top Tube[[id number]]                               Final result                 Please view results for these tests on the individual orders.   Extra Red Top Tube     Status: None   Result Value Ref Range    Hold Specimen [facility]    EKG 12-lead, tracing only     Status: None (Preliminary result)   Result Value Ref Range    Systolic Blood Pressure  mmHg    Diastolic Blood Pressure  mmHg    Ventricular Rate 115 BPM    Atrial Rate 115 BPM    VA Interval 120 ms    QRS Duration 96 ms     ms    QTc 625 ms    P Axis  degrees    R AXIS 28 degrees    T Axis 41 degrees    Interpretation ECG Sinus tachycardia  Prolonged QT  Abnormal ECG      CBC with platelets differential     Status: Abnormal    Narrative    The following orders were created for panel order CBC with platelets differential.  Procedure                               Abnormality         Status                     ---------                               -----------         ------                     CBC with platelets and d...[[id number]]  Abnormal            Final result                 Please view results for these tests on the individual orders.     Medications   0.9% sodium chloride BOLUS (1,000 mLs Intravenous New Bag 10/29/22 2000)     Followed by   sodium chloride 0.9% infusion (has no administration in time range)   acetaminophen (TYLENOL) tablet 1,000 mg (1,000 mg Oral Given 10/29/22 2000)        Assessments & Plan (with Medical Decision Making)    Belkis Butler is a 47 year old female with a PMH of hypertension, not currently controlled on medications who presents to the emergency department concern for COVID.     Patient presented tachycardic with heart rate 136 and hypertensive 168/101.  EKG showed sinus tachycardia with QT prolongation.  No prior EKGs available. Covid/Influenza testing was performed which showed patient was positive for COVID-19 infection.  Influenza and RSV were negative.  IV was established labs drawn and sent for analysis which showed no leukocytosis WBC 7.1.  Slight hyponatremia sodium 134.  Patient was given 1 L IV fluids as well as Tylenol for her headache.    Discussed results with patient.  Patient requested prescription for Paxlovid, I feel this is appropriate given her untreated hypertension, family history of cardiovascular disease including the recent passing of her father from COVID-19.  Prescription was given. patient is fully vaccinated against COVID 19, however has not received the latest bivalent booster.  We discussed the possibility of rebound infection and the possible need to restart quarantine if symptoms return.    Patient reported only minimal improvement of her symptoms following fluids and acetaminophen, however she stated she felt ready to discharge and no longer wanted to be in the emergency department.  She is not having any chest pain or other respiratory symptoms.  Did discuss return precautions and encouraged to return to the emergency department if she is having any concerns.  Patient did inquire if she should start taking low-dose aspirin prophylactically.  I did discuss with patient that there were no studies specifically indicating this, however encouraged her to discuss this with her primary care provider.  She has no other comorbidities, if she does start taking this I do not feel that she will be unsafe to take a low-dose aspirin.  If she does start that she should discuss with her primary  provider how long she should continue this medication.    I have reviewed the nursing notes. I have reviewed the findings, diagnosis, plan and need for follow up with the patient.    New Prescriptions    NIRMATRELVIR AND RITONAVIR (PAXLOVID) THERAPY PACK    Take 3 tablets by mouth 2 times daily for 5 days Take 2 Nirmatrelvir tablets and 1 Ritonavir tablet twice daily for 5 days.       Final diagnoses:   Infection due to 2019 novel coronavirus       --  FILOMENA Vera CNP  [facility] EMERGENCY DEPARTMENT  10/29/2022     Jeanne Sloan APRN CNP  10/29/22 2134

## 2022-11-25 ENCOUNTER — ANCILLARY PROCEDURE (OUTPATIENT)
Dept: MAMMOGRAPHY | Facility: CLINIC | Age: 47
End: 2022-11-25
Attending: OBSTETRICS & GYNECOLOGY
Payer: COMMERCIAL

## 2022-11-25 DIAGNOSIS — Z12.31 VISIT FOR SCREENING MAMMOGRAM: ICD-10-CM

## 2022-11-25 PROCEDURE — 77063 BREAST TOMOSYNTHESIS BI: CPT | Mod: GC | Performed by: RADIOLOGY

## 2022-11-25 PROCEDURE — 77067 SCR MAMMO BI INCL CAD: CPT | Mod: GC | Performed by: RADIOLOGY

## 2022-11-29 ENCOUNTER — MEDICAL CORRESPONDENCE (OUTPATIENT)
Dept: HEALTH INFORMATION MANAGEMENT | Facility: CLINIC | Age: 47
End: 2022-11-29

## 2022-12-05 ENCOUNTER — ANCILLARY PROCEDURE (OUTPATIENT)
Dept: MAMMOGRAPHY | Facility: CLINIC | Age: 47
End: 2022-12-05
Attending: OBSTETRICS & GYNECOLOGY
Payer: COMMERCIAL

## 2022-12-05 DIAGNOSIS — R92.8 ABNORMAL MAMMOGRAM OF BOTH BREASTS: ICD-10-CM

## 2022-12-05 PROCEDURE — 77066 DX MAMMO INCL CAD BI: CPT | Performed by: RADIOLOGY

## 2022-12-05 PROCEDURE — 76642 ULTRASOUND BREAST LIMITED: CPT | Mod: RT | Performed by: RADIOLOGY

## 2022-12-05 PROCEDURE — G0279 TOMOSYNTHESIS, MAMMO: HCPCS | Performed by: RADIOLOGY

## 2023-06-03 ENCOUNTER — HEALTH MAINTENANCE LETTER (OUTPATIENT)
Age: 48
End: 2023-06-03

## 2023-08-08 ENCOUNTER — ANCILLARY PROCEDURE (OUTPATIENT)
Dept: GENERAL RADIOLOGY | Facility: CLINIC | Age: 48
End: 2023-08-08
Attending: FAMILY MEDICINE
Payer: COMMERCIAL

## 2023-08-08 ENCOUNTER — OFFICE VISIT (OUTPATIENT)
Dept: ORTHOPEDICS | Facility: CLINIC | Age: 48
End: 2023-08-08
Payer: COMMERCIAL

## 2023-08-08 VITALS — WEIGHT: 170 LBS | HEIGHT: 64 IN | BODY MASS INDEX: 29.02 KG/M2

## 2023-08-08 DIAGNOSIS — S92.515A CLOSED NONDISPLACED FRACTURE OF PROXIMAL PHALANX OF LESSER TOE OF LEFT FOOT, INITIAL ENCOUNTER: Primary | ICD-10-CM

## 2023-08-08 DIAGNOSIS — S99.922A FOOT INJURY, LEFT, INITIAL ENCOUNTER: Primary | ICD-10-CM

## 2023-08-08 DIAGNOSIS — S99.922A FOOT INJURY, LEFT, INITIAL ENCOUNTER: ICD-10-CM

## 2023-08-08 PROCEDURE — 73630 X-RAY EXAM OF FOOT: CPT | Mod: LT | Performed by: RADIOLOGY

## 2023-08-08 PROCEDURE — 99203 OFFICE O/P NEW LOW 30 MIN: CPT | Performed by: FAMILY MEDICINE

## 2023-08-08 NOTE — PROGRESS NOTES
CHIEF COMPLAINT:  Pain of the Left Foot       HISTORY OF PRESENT ILLNESS  Ms. Butler is a pleasant 48 year old year old female who presents to clinic today with a left foot injury.  Belkis explains that while in her home last evening, she tripped over her daughter gymnastic bar and injured her left foot. She points to the lateral aspect of her left foot.     Onset: sudden  Location: left foot  Quality:  aching, dull, sharp, and shooting  Duration: 1 days   Severity: 8/10 at worst  Timing:constant  Modifying factors:  resting and non-use makes it better, movement and use makes it worse  Associated signs & symptoms: pain, tenderness, swelling, and bruising.  Previous similar pain: No  Treatments to date: applied an ice pack, ibuprofen and rest.     Additional history: as documented    Review of Systems:  Have you recently had a a fever, chills, weight loss? No  Do you have any vision problems? No  Do you have any chest pain or edema? No  Do you have any shortness of breath or wheezing?  No  Do you have stomach problems? No  Do you have any numbness or focal weakness? No  Do you have diabetes? No  Do you have problems with bleeding or clotting? No  Do you have an rashes or other skin lesions? No    MEDICAL HISTORY  Patient Active Problem List   Diagnosis    Ureteral stone       Current Outpatient Medications   Medication Sig Dispense Refill    cetirizine (ZYRTEC) 10 MG tablet Take 10 mg by mouth daily      ibuprofen (ADVIL/MOTRIN) 200 MG tablet Take 400 mg by mouth every 4 hours as needed for mild pain         Allergies   Allergen Reactions    Penicillins        Family History   Problem Relation Age of Onset    Asthma Sister     Allergies Mother     Allergies Sister     Cancer Paternal Grandfather         lung    Ovarian Cancer Paternal Grandmother     Cancer Maternal Grandmother         leukemia    Heart Disease Maternal Grandmother     Heart Disease Maternal Grandfather        Additional medical/Social/Surgical  histories reviewed in EPIC and updated as appropriate.       PHYSICAL EXAM  There were no vitals taken for this visit.    Musculoskeletal - left fifth toe  - inspection: Ecchymosis and swelling  - palpation: Tender proximal phalanx of fifth toe  - ROM:  Decreased ROM due to pain of left fifth toe  - strength: Grossly intact  - special tests: Deferred    IMAGING : XR left foot Final results and radiologist's interpretation, available in the [facility] health record. Images were reviewed with the patient/family members in the office today. My personal interpretation of the performed imaging is proximal phalanx fracture essentially nondisplaced at base of fifth toe.      ASSESSMENT & PLAN  Ms. Butler is a 48 year old year old female who presents to clinic today with left fifth proximal phalanx fracture suffered yesterday tripping over her daughter's gymnastics.      Diagnosis: (S92.515A) Closed nondisplaced fracture of proximal phalanx of lesser toe of left foot, initial encounter  (primary encounter diagnosis)    Comment: Essentially nondisplaced in good alignment and no surgery warranted.    Plan: Discussed use of walking boot versus postop shoe.  She will wish to take the walking boot after trying both.  Recommended elevation, icing, use of NSAIDs.  I would like her to viviana tape toes 4-5.  Recommend following up in 4 weeks with repeat x-rays.  Sooner if pain does increase or she has any trauma to this region.    All questions were answered.    It was a pleasure seeing Belkis today.    Luis Angel Cardona DO, CAQSM  Primary Care Sports Medicine

## 2023-08-08 NOTE — LETTER
8/8/2023      RE: Belkis Butler  [address] [place] [address]     Dear Colleague,    Thank you for referring your patient, Belkis Butler, to the [facility]TS MED[facility]INIC [place]. Please see a copy of my visit note below.    CHIEF COMPLAINT:  Pain of the Left Foot       HISTORY OF PRESENT ILLNESS  Ms. Butler is a pleasant 48 year old year old female who presents to clinic today with a left foot injury.  Belkis explains that while in her home last evening, she tripped over her daughter gymnastic bar and injured her left foot. She points to the lateral aspect of her left foot.     Onset: sudden  Location: left foot  Quality:  aching, dull, sharp, and shooting  Duration: 1 days   Severity: 8/10 at worst  Timing:constant  Modifying factors:  resting and non-use makes it better, movement and use makes it worse  Associated signs & symptoms: pain, tenderness, swelling, and bruising.  Previous similar pain: No  Treatments to date: applied an ice pack, ibuprofen and rest.     Additional history: as documented    Review of Systems:  Have you recently had a a fever, chills, weight loss? No  Do you have any vision problems? No  Do you have any chest pain or edema? No  Do you have any shortness of breath or wheezing?  No  Do you have stomach problems? No  Do you have any numbness or focal weakness? No  Do you have diabetes? No  Do you have problems with bleeding or clotting? No  Do you have an rashes or other skin lesions? No    MEDICAL HISTORY  Patient Active Problem List   Diagnosis     Ureteral stone       Current Outpatient Medications   Medication Sig Dispense Refill     cetirizine (ZYRTEC) 10 MG tablet Take 10 mg by mouth daily       ibuprofen (ADVIL/MOTRIN) 200 MG tablet Take 400 mg by mouth every 4 hours as needed for mild pain         Allergies   Allergen Reactions     Penicillins        Family History   Problem Relation Age of Onset     Asthma Sister      Allergies Mother       Allergies Sister      Cancer Paternal Grandfather         lung     Ovarian Cancer Paternal Grandmother      Cancer Maternal Grandmother         leukemia     Heart Disease Maternal Grandmother      Heart Disease Maternal Grandfather        Additional medical/Social/Surgical histories reviewed in [facility] and updated as appropriate.       PHYSICAL EXAM  There were no vitals taken for this visit.    Musculoskeletal - left fifth toe  - inspection: Ecchymosis and swelling  - palpation: Tender proximal phalanx of fifth toe  - ROM:  Decreased ROM due to pain of left fifth toe  - strength: Grossly intact  - special tests: Deferred    IMAGING : XR left foot Final results and radiologist's interpretation, available in the [facility] health record. Images were reviewed with the patient/family members in the office today. My personal interpretation of the performed imaging is proximal phalanx fracture essentially nondisplaced at base of fifth toe.      ASSESSMENT & PLAN  Ms. Butler is a 48 year old year old female who presents to clinic today with left fifth proximal phalanx fracture suffered yesterday tripping over her daughter's gymnastics.      Diagnosis: (S92.515A) Closed nondisplaced fracture of proximal phalanx of lesser toe of left foot, initial encounter  (primary encounter diagnosis)    Comment: Essentially nondisplaced in good alignment and no surgery warranted.    Plan: Discussed use of walking boot versus postop shoe.  She will wish to take the walking boot after trying both.  Recommended elevation, icing, use of NSAIDs.  I would like her to viviana tape toes 4-5.  Recommend following up in 4 weeks with repeat x-rays.  Sooner if pain does increase or she has any trauma to this region.    All questions were answered.    It was a pleasure seeing Belkis today.    Luis Angel Cardona DO, [facility]  Primary Care Sports Medicine      Again, thank you for allowing me to participate in the care of your patient.      Sincerely,    Luis Angel Cardona,  DO

## 2023-08-08 NOTE — NURSING NOTE
DME FITTING    Relevant Diagnosis: left little toe fracture  Walking boot was fit on patient's Left ankle/foot.     Person(s) involved in teaching:   Patient    Brace was applied in standard Manner:  Yes  Brace fit well:  Yes  Patient reports brace to fit comfortably:  Yes    Education:   Patient shown self application and removal of brace: Yes  Patient shown how to adjust brace fit, if necessary: Yes  Patient educated on billing and return policy: Yes  Patient confirmed understanding when and how to contact clinic with concerns: Yes

## 2023-08-31 DIAGNOSIS — S92.515A CLOSED NONDISPLACED FRACTURE OF PROXIMAL PHALANX OF LESSER TOE OF LEFT FOOT, INITIAL ENCOUNTER: Primary | ICD-10-CM

## 2023-09-05 ENCOUNTER — ANCILLARY PROCEDURE (OUTPATIENT)
Dept: GENERAL RADIOLOGY | Facility: CLINIC | Age: 48
End: 2023-09-05
Attending: FAMILY MEDICINE
Payer: COMMERCIAL

## 2023-09-05 ENCOUNTER — OFFICE VISIT (OUTPATIENT)
Dept: ORTHOPEDICS | Facility: CLINIC | Age: 48
End: 2023-09-05
Payer: COMMERCIAL

## 2023-09-05 VITALS — BODY MASS INDEX: 29.02 KG/M2 | WEIGHT: 170 LBS | HEIGHT: 64 IN

## 2023-09-05 DIAGNOSIS — S92.515D CLOSED NONDISPLACED FRACTURE OF PROXIMAL PHALANX OF LESSER TOE OF LEFT FOOT WITH ROUTINE HEALING, SUBSEQUENT ENCOUNTER: Primary | ICD-10-CM

## 2023-09-05 DIAGNOSIS — S92.515A CLOSED NONDISPLACED FRACTURE OF PROXIMAL PHALANX OF LESSER TOE OF LEFT FOOT, INITIAL ENCOUNTER: ICD-10-CM

## 2023-09-05 PROCEDURE — 99213 OFFICE O/P EST LOW 20 MIN: CPT | Performed by: FAMILY MEDICINE

## 2023-09-05 PROCEDURE — 73630 X-RAY EXAM OF FOOT: CPT | Mod: LT | Performed by: RADIOLOGY

## 2023-09-05 NOTE — LETTER
"  9/5/2023      RE: Belkis Butler  [address]  [place] [address]     Dear Colleague,    Thank you for referring your patient, Belkis Butler, to the [facility]TS MEDICINE CLINIC [place]. Please see a copy of my visit note below.    ESTABLISHED PATIENT FOLLOW-UP:  RECHECK of the Left Foot     HISTORY OF PRESENT ILLNESS  Ms. Butler is a pleasant 48 year old year old female who presents to clinic today for follow-up of left little toe fracture that occurred four weeks ago.     Date of injury/onset: 8/7/2023  Date last seen: 8/8/2023  Following Therapeutic Plan: Yes  Pain: improved  Function: improved, she has been able to transition to a post-op shoe prn from a walking boot. She has noticed she will experience discomfort over the left foot when she tries to wear a shoe.     MEDICAL HISTORY  Patient Active Problem List   Diagnosis     Ureteral stone       Current Outpatient Medications   Medication Sig Dispense Refill     cetirizine (ZYRTEC) 10 MG tablet Take 10 mg by mouth daily       ibuprofen (ADVIL/MOTRIN) 200 MG tablet Take 400 mg by mouth every 4 hours as needed for mild pain         Allergies   Allergen Reactions     Penicillins        Family History   Problem Relation Age of Onset     Asthma Sister      Allergies Mother      Allergies Sister      Cancer Paternal Grandfather         lung     Ovarian Cancer Paternal Grandmother      Cancer Maternal Grandmother         leukemia     Heart Disease Maternal Grandmother      Heart Disease Maternal Grandfather        Additional medical/Social/Surgical histories reviewed in [facility] and updated as appropriate.       PHYSICAL EXAM  Ht 1.626 m (5' 4\")   Wt 77.1 kg (170 lb)   BMI 29.18 kg/m      Musculoskeletal - left fifth toe  - inspection: Trace swelling, resolved ecchymosis  - palpation: Non-tender proximal phalanx of fifth toe  - ROM:  Intact grossly  - strength: Grossly intact  - special tests: Deferred    IMAGING : XR left fifth toe. Final " results and radiologist's interpretation, available in the [facility] health record. Images were reviewed with the patient/family members in the office today. My personal interpretation of the performed imaging is healing proximal phalanx fracture of fifth toe.  Callus formation noted with stable appearance.     ASSESSMENT & PLAN  Ms. Butler is a 48 year old year old female who presents to clinic today with RECHECK of the Left Foot    Diagnosis:   (S92.515D) Closed nondisplaced fracture of proximal phalanx of lesser toe of left foot with routine healing, subsequent encounter  (primary encounter diagnosis)    - Reviewed and robust healing to date  - Continue post op shoe and transition to firm soled shoe as able  - Return to biking in bike shoe with viviana tape  - Consider all return activities in 2 weeks  - Follow up if pain persists in 2-4 weeks despite aforementioned transition measures and will repeat one additional xray.  Otherwise encouraging xrays today and we can progress to full activity based on this.    It was a pleasure seeing Belkis.    Luis Angel Cardona DO, [facility]  Primary Care Sports Medicine      Again, thank you for allowing me to participate in the care of your patient.      Sincerely,    Luis Angel Cardona DO

## 2023-09-05 NOTE — PROGRESS NOTES
"ESTABLISHED PATIENT FOLLOW-UP:  RECHECK of the Left Foot     HISTORY OF PRESENT ILLNESS  Ms. Butler is a pleasant 48 year old year old female who presents to clinic today for follow-up of left little toe fracture that occurred four weeks ago.     Date of injury/onset: 8/7/2023  Date last seen: 8/8/2023  Following Therapeutic Plan: Yes  Pain: improved  Function: improved, she has been able to transition to a post-op shoe prn from a walking boot. She has noticed she will experience discomfort over the left foot when she tries to wear a shoe.     MEDICAL HISTORY  Patient Active Problem List   Diagnosis    Ureteral stone       Current Outpatient Medications   Medication Sig Dispense Refill    cetirizine (ZYRTEC) 10 MG tablet Take 10 mg by mouth daily      ibuprofen (ADVIL/MOTRIN) 200 MG tablet Take 400 mg by mouth every 4 hours as needed for mild pain         Allergies   Allergen Reactions    Penicillins        Family History   Problem Relation Age of Onset    Asthma Sister     Allergies Mother     Allergies Sister     Cancer Paternal Grandfather         lung    Ovarian Cancer Paternal Grandmother     Cancer Maternal Grandmother         leukemia    Heart Disease Maternal Grandmother     Heart Disease Maternal Grandfather        Additional medical/Social/Surgical histories reviewed in [facility] and updated as appropriate.       PHYSICAL EXAM  Ht 1.626 m (5' 4\")   Wt 77.1 kg (170 lb)   BMI 29.18 kg/m      Musculoskeletal - left fifth toe  - inspection: Trace swelling, resolved ecchymosis  - palpation: Non-tender proximal phalanx of fifth toe  - ROM:  Intact grossly  - strength: Grossly intact  - special tests: Deferred    IMAGING : XR left fifth toe. Final results and radiologist's interpretation, available in the [facility] health record. Images were reviewed with the patient/family members in the office today. My personal interpretation of the performed imaging is healing proximal phalanx fracture of fifth toe.  Callus formation " noted with stable appearance.     ASSESSMENT & PLAN  Ms. Butler is a 48 year old year old female who presents to clinic today with RECHECK of the Left Foot    Diagnosis:   (P84.769D) Closed nondisplaced fracture of proximal phalanx of lesser toe of left foot with routine healing, subsequent encounter  (primary encounter diagnosis)    - Reviewed and robust healing to date  - Continue post op shoe and transition to firm soled shoe as able  - Return to biking in bike shoe with viviana tape  - Consider all return activities in 2 weeks  - Follow up if pain persists in 2-4 weeks despite aforementioned transition measures and will repeat one additional xray.  Otherwise encouraging xrays today and we can progress to full activity based on this.    It was a pleasure seeing Belkis.    Luis Angel Cardona DO, CAQSM  Primary Care Sports Medicine

## 2023-12-13 ENCOUNTER — ANCILLARY PROCEDURE (OUTPATIENT)
Dept: MAMMOGRAPHY | Facility: CLINIC | Age: 48
End: 2023-12-13
Attending: OBSTETRICS & GYNECOLOGY
Payer: COMMERCIAL

## 2023-12-13 DIAGNOSIS — Z12.31 VISIT FOR SCREENING MAMMOGRAM: ICD-10-CM

## 2023-12-13 PROCEDURE — 77067 SCR MAMMO BI INCL CAD: CPT | Mod: GC | Performed by: RADIOLOGY

## 2023-12-13 PROCEDURE — 77063 BREAST TOMOSYNTHESIS BI: CPT | Mod: GC | Performed by: RADIOLOGY

## 2024-07-07 ENCOUNTER — HEALTH MAINTENANCE LETTER (OUTPATIENT)
Age: 49
End: 2024-07-07

## 2025-01-21 ENCOUNTER — ANCILLARY PROCEDURE (OUTPATIENT)
Dept: MAMMOGRAPHY | Facility: CLINIC | Age: 50
End: 2025-01-21
Attending: OBSTETRICS & GYNECOLOGY
Payer: COMMERCIAL

## 2025-01-21 DIAGNOSIS — Z12.31 VISIT FOR SCREENING MAMMOGRAM: ICD-10-CM

## 2025-01-21 PROCEDURE — 77067 SCR MAMMO BI INCL CAD: CPT | Mod: GC

## 2025-01-21 PROCEDURE — 77063 BREAST TOMOSYNTHESIS BI: CPT | Mod: GC

## 2025-02-10 ENCOUNTER — TRANSCRIBE ORDERS (OUTPATIENT)
Dept: OTHER | Age: 50
End: 2025-02-10

## 2025-02-10 DIAGNOSIS — I10 HTN (HYPERTENSION): Primary | ICD-10-CM

## 2025-05-22 DIAGNOSIS — Z13.6 CARDIOVASCULAR SCREENING; LDL GOAL LESS THAN 100: Primary | ICD-10-CM

## 2025-05-29 ENCOUNTER — LAB (OUTPATIENT)
Dept: LAB | Facility: CLINIC | Age: 50
End: 2025-05-29
Payer: COMMERCIAL

## 2025-05-29 ENCOUNTER — OFFICE VISIT (OUTPATIENT)
Dept: CARDIOLOGY | Facility: CLINIC | Age: 50
End: 2025-05-29
Attending: NURSE PRACTITIONER
Payer: COMMERCIAL

## 2025-05-29 VITALS
OXYGEN SATURATION: 98 % | DIASTOLIC BLOOD PRESSURE: 75 MMHG | HEART RATE: 62 BPM | BODY MASS INDEX: 28.48 KG/M2 | SYSTOLIC BLOOD PRESSURE: 108 MMHG | WEIGHT: 166.8 LBS | HEIGHT: 64 IN

## 2025-05-29 DIAGNOSIS — I10 BENIGN ESSENTIAL HYPERTENSION: ICD-10-CM

## 2025-05-29 DIAGNOSIS — Z13.6 CARDIOVASCULAR SCREENING; LDL GOAL LESS THAN 100: ICD-10-CM

## 2025-05-29 DIAGNOSIS — I10 BENIGN ESSENTIAL HYPERTENSION: Primary | ICD-10-CM

## 2025-05-29 LAB
ATRIAL RATE - MUSE: 60 BPM
CHOLEST SERPL-MCNC: 175 MG/DL
CREAT UR-MCNC: 273 MG/DL
CRP SERPL HS-MCNC: 1.29 MG/L
DIASTOLIC BLOOD PRESSURE - MUSE: NORMAL MMHG
FASTING STATUS PATIENT QL REPORTED: YES
FASTING STATUS PATIENT QL REPORTED: YES
GLUCOSE SERPL-MCNC: 102 MG/DL (ref 70–99)
HDLC SERPL-MCNC: 42 MG/DL
INTERPRETATION ECG - MUSE: NORMAL
LDLC SERPL CALC-MCNC: 109 MG/DL
MICROALBUMIN UR-MCNC: <12 MG/L
MICROALBUMIN/CREAT UR: NORMAL MG/G{CREAT}
NONHDLC SERPL-MCNC: 133 MG/DL
P AXIS - MUSE: 45 DEGREES
PR INTERVAL - MUSE: 186 MS
QRS DURATION - MUSE: 94 MS
QT - MUSE: 432 MS
QTC - MUSE: 432 MS
R AXIS - MUSE: 43 DEGREES
SYSTOLIC BLOOD PRESSURE - MUSE: NORMAL MMHG
T AXIS - MUSE: 44 DEGREES
TRIGL SERPL-MCNC: 118 MG/DL
VENTRICULAR RATE- MUSE: 60 BPM

## 2025-05-29 PROCEDURE — 82570 ASSAY OF URINE CREATININE: CPT | Performed by: NURSE PRACTITIONER

## 2025-05-29 PROCEDURE — 83695 ASSAY OF LIPOPROTEIN(A): CPT | Performed by: NURSE PRACTITIONER

## 2025-05-29 PROCEDURE — 99000 SPECIMEN HANDLING OFFICE-LAB: CPT | Performed by: PATHOLOGY

## 2025-05-29 PROCEDURE — 86141 C-REACTIVE PROTEIN HS: CPT | Performed by: NURSE PRACTITIONER

## 2025-05-29 RX ORDER — TIRZEPATIDE 7.5 MG/.5ML
INJECTION, SOLUTION SUBCUTANEOUS
COMMUNITY
Start: 2025-05-13

## 2025-05-29 RX ORDER — LISINOPRIL 2.5 MG/1
TABLET ORAL
COMMUNITY
Start: 2025-02-19

## 2025-05-29 NOTE — PROGRESS NOTES
[facility] Cardi[facility] Disease Prevention - Exam Note    Active Problems   Patient Active Problem List    Diagnosis Date Noted    Ureteral stone 01/17/2022     Priority: Medium     Added automatically from request for surgery [id number]         Reason For Visit   Patient here for [facility] early detection of atherosclerosis and CVD exam.    Pain Evaluation  Current history of pain associated with this visit is: denied    Cardiac risk factors:    - age      - smoking      - elevated BMI        + Family history CVD         - Diet           + Hypertension    HPI   Belkis Butler is a 50 year old year old female with a history of hypertension and PCOS.  She does not check her BP at home.  Her family history of CV disease includes her mother having hypertension and hyperlipidemia, her father had CAD, s/p MI at 72, hypertension, hyperlipidemia and PVD.  Her maternal grandmother had CABG surgery at age 58 and her maternal grandfather had CABG surgery at age 75.  Her/ primary care provider is Aranza Paulson NP at [facility].  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  She has a headache 1x/week.  Today in clinic she denies chest pain at rest, with activity, while sleeping, SOB at rest, with activity or while sleeping, palpitations, lightheadedness, lower leg edema, calf cramps, indigestion, or issues with her memory.     Nutrition assessment per patient report:   Foods with fat/cholesterol (fried foods, fatty meats, junk food):  1x/month   Fruits and vegetables (  cup cooked, 1 cup raw):  2-4 servings of vegetables/day, 1-3 servings of fruit/day  Caffeine (1 cup coffee, soda, etc):  1 cup of coffee/day 1 diet coke/day  Alcohol servings (12 oz. beer, 4 oz. wine, 1  oz. in mixed drink):  1-2 drinks/week  Special dietary habits:  increased protein intake  Typical breakfast:  skip                 Lunch: salad                  Dinner: protein, rice, vegetables                 Snacks: no                 Drinks:  water  Activity  Patient is active walking 10,000 step/day,  strength training 1x/week 25 minutes    Sleep pattern: okay getting to sleep, trouble staying asleep    Laboratory Results Review  We discussed laboratory results today including lipids targets and how foods influence cholesterol.    Weight  Her perceived healthy weight is 150-155 pounds.  A normal BMI of 25 is equal to 145 pounds.  The current BMI of 29.18 is overweight range.  A weight reduction speed of 1-2 lbs per month for women is recommended.    PMH   Past Medical History:   Diagnosis Date    Benign essential hypertension     PCOS (polycystic ovarian syndrome)        PSH  Past Surgical History:   Procedure Laterality Date    COLONOSCOPY N/A 4/6/2022    Procedure: COLONOSCOPY, WITH POLYPECTOMY, WITH CLIP;  Surgeon: Dominic Quinteros MD;  Location: [facility] OR    COMBINED CYSTOSCOPY, RETROGRADES, URETEROSCOPY, LASER HOLMIUM LITHOTRIPSY URETER(S), INSERT STENT Left 1/19/2022    Procedure: CYSTOURETEROSCOPY, WITH RETROGRADE PYELOGRAM, HOLMIUM LASER LITHOTRIPSY, LEFT STENT INSERTION;  Surgeon: Juan Abebe MD;  Location: [facility] OR    D & C         Current Meds   Current Outpatient Medications   Medication Sig Dispense Refill    estradiol-norethindrone (COMBIPATCH) 0.05-0.14 MG/DAY bi-weekly patch       lisinopril (ZESTRIL) 2.5 MG tablet       ZEPBOUND 7.5 MG/0.5ML prefilled pen inject 0.5 ml (7.5 mg) subcutaneously weekly      ibuprofen (ADVIL/MOTRIN) 200 MG tablet Take 400 mg by mouth every 4 hours as needed for mild pain         Allergies      Allergies   Allergen Reactions    Penicillins        Family Hx   Family History   Problem Relation Age of Onset    Allergies Mother     Hypertension Mother     Hyperlipidemia Mother     Coronary Artery Disease Father 72        s/p MI, smoker    Hypertension Father     Hyperlipidemia Father     Peripheral Vascular  "Disease Father     Asthma Sister     Allergies Sister     Cancer Maternal Grandmother         leukemia, CML    Coronary Artery Disease Early Onset Maternal Grandmother 58        s/p CABG    Coronary Artery Disease Maternal Grandfather 75        s/p CABG    Cancer Paternal Grandmother     Lung Cancer Paternal Grandfather         smoker       Social History    She is      Tobacco History  History   Smoking Status    Never   Smokeless Tobacco    Never       ROS  General:  WDWN in NAD  EENT:  Denies visual disturbances, epistaxis, sore throat  Respiratory:  Denies SOB, cough, sputum production  Cardiovascular:  see HPI  GI:  Denies nausea, vomiting, hematemesis, melena  :  denies hematuria, dysuria  Skin:  Denies rashes, lesions or open wounds  Psych:  Pleasant affect    Vital Signs   /75 (Cuff Size: Adult Regular)   Pulse 62   Ht 1.626 m (5' 4\")   Wt 75.7 kg (166 lb 12.8 oz)   SpO2 98%   BMI 28.63 kg/m        Waist: 35 inches  Hip: 42 inches    Physical Exam   In general, the patient is a pleasant female in no apparent distress   HEENT: NC/AT, PERRLA, EOMI, sclerae white, not injected. Nares clear, pharynx without erythema or exudate, dentition intact    Neck: No adenopathy, no thyromegaly, carotids +4/4 bilaterally without bruits,  no jugular venous distension   Lungs: Breath sounds clear bilaterally, without crackles, ronchi, or wheezes  Cor: RRR, S1S2 without murmur, rub, click, or gallop, the PMI is in the 5th ICS in the midclavicular line  Abdomen: Soft, nontender, nondistended, BS+ in all 4 quadrants, without hepatomegaly, no aorta or renal artery bruits  Extremities: No clubbing, cyanosis, or edema. DP and PT pulses +2/4 bilaterally    The 10-year ASCVD risk score (Willardberna SARAVIA Jr., et al., 2013) is: 1.4%  Values used to calculate the score:   Age: 50 year old   Sex: female   Is Non- : No   Diabetic: No   Tobacco smoker: No   Systolic Blood Press: 108 mmHg   Is BP treated: " "Yes   HDL Cholesterol: 42 mg/dL   Total Cholesterol: 175 mg/dL    Recent Labs  Lab Results   Component Value Date     (H) 05/29/2025     (H) 01/16/2022    GLC 93 09/12/2016      No results found for: \"NTBNP\"  No results found for: \"NTBNPI\"   No results found for: \"UCRR\"   No results found for: \"MICROL\"   No results found for: \"MICROALBUMIN\"   No results found for: \"CRP\"   Lab Results   Component Value Date    CHOL 175 05/29/2025    CHOL 194 09/12/2016      Lab Results   Component Value Date    TRIG 118 05/29/2025    TRIG 114 09/12/2016      Lab Results   Component Value Date    HDL 42 (L) 05/29/2025    HDL 54 09/12/2016      Lab Results   Component Value Date     (H) 05/29/2025     (H) 09/12/2016      No results found for: \"VLDL\"   No results found for: \"CHOLHDLRATIO\"  Lab Results   Component Value Date    NHDL 133 (H) 05/29/2025    NHDL 140 (H) 09/12/2016        Assessment:    Cardiovascular:  Asymptomatic, she is not complaining of chest pain, EKG revealed NSR, no plaque detected in carotid arteries, check CAC to further investigate her risk of CV disease    Blood Pressure:  She takes 2.5 mg of lisinopril, /75,  she has lost 20 pounds, not sure that she needs ACE-I now, asked her to check her BP for 2 weeks and then discuss with PCP    Lipids:  she does not take a statin medication, check a Lp(a), result 6.      Latest Ref Rng 5/29/2025  6:36 AM   BP WT  CHOL     Cholesterol <200 mg/dL 175    HDL Cholesterol >=50 mg/dL 42 (L)    LDL Cholesterol Calculated <100 mg/dL 109 (H)    Triglycerides <150 mg/dL 118       Glucose: 102, continue to monitor with PCP, check A1C, A1C 5.6 2/25    Sleep pattern:  Sleep hygiene reviewed during clinic visit, handout given to patient    Weight Management: BMI 29.18, taking Zepbound, has lost 20 pounds since Feb. Continue to work with PCP on weight loss    Exercise:  Increase exercise to 150 hours/week    Return to Clinic: 5 years    Health Habit " Summary:  Nutrition: Heart Healthy Eating:  most of the time   Exercise:  regularly active  Weight:  normal weight range  Tobacco Use:  never used    This case was presented to Dr. Villatoro and Dr. Mindy Hyman during our weekly conference.     60 minutes spent on the date of the encounter doing (chart review/review of outside records/review of test results/interpretation of tests/patient visit/documentation/discussion with other provider(s)   FILOMENA Nathan CNP       CC  Patient Care Team:  Poppy Pascual MD as PCP - General  Mally Portillo CNP as Nurse Practitioner (Urology)  АЛЕКСАНДР MAURER

## 2025-05-29 NOTE — PROGRESS NOTES
Laughlin Test Results    WALKING BLOOD PRESSURE RESPONSE (3 minute, 5 MET level walk)   Pre BP: 110/70 mmHg  3 min BP: 120/62 mmHg  1 min post BP: 110/72 mmHg    Pre HR: 66 bpm  3 min HR: 90 bpm  1 min post HR: 70 bpm     Test results: Walking blood pressure response to 3 minutes activity is in normal range.      RETINAL VASCULAR ASSESSMENT   Left Eye Abnormality:  none  AV Ratio: 0.8    Right Eye Abnormality:  none  AV Ratio: 0.8     Retinal Assessment:  normal    ABDOMINAL AORTA ULTRASOUND (< 2.5 normal, borderline 2.5-2.9, abnormal > 3)   SupraIliac 2.11 cm    SupraRenal 1.94 cm    InfraRenal Proximal 1.82 cm    InfraRenal Distal 1.71 cm      Abdominal Aorta Assessment:  normal    LEFT VENTRICULAR ULTRASOUND MEASUREMENTS (adjusted for BSA)  LVIDD 48.7 mm   Septa 8.5 mm   Posterior 8.4 mm     Left Ventricular US Assessment:  normal    Carotid Artery IMT measurements report and plaques in the small area examined:   Left IMT 0.588 mm  Plaques none    Right IMT 0.573 mm  Plaques none     Test results: Carotid arteries wall thickening is in normal range with no plaque formations present.     ECG (see tracing):  normal sinus rhythm;  rate: 60 bpm    Arterial Elasticity per age and gender (see printout):   C1 17.9 mL/mmHg x 10  normal   C2 8.3 mL/mmHg x 100 normal   Supine blood pressure: 116/85 mmHg     Test results: Arterial elasticity of the large and small size arteries is in normal range after adjusting for age and gender.     Laughlin disease score: 0    Cindi Boggs

## 2025-05-31 LAB
ATRIAL RATE - MUSE: 60 BPM
DIASTOLIC BLOOD PRESSURE - MUSE: NORMAL MMHG
INTERPRETATION ECG - MUSE: NORMAL
P AXIS - MUSE: 45 DEGREES
PR INTERVAL - MUSE: 186 MS
QRS DURATION - MUSE: 94 MS
QT - MUSE: 432 MS
QTC - MUSE: 432 MS
R AXIS - MUSE: 43 DEGREES
SYSTOLIC BLOOD PRESSURE - MUSE: NORMAL MMHG
T AXIS - MUSE: 44 DEGREES
VENTRICULAR RATE- MUSE: 60 BPM

## 2025-06-02 LAB — APO A-I SERPL-MCNC: 6 MG/DL

## 2025-06-05 NOTE — PATIENT INSTRUCTIONS
Screening Results Summary Report     [facility] for Cardiovascular Disease Prevention    Thank you for choosing to participate in the prevention screening offered at the [facility]. Prevention screening is important part of health care.  Atherosclerosis may result in heart attacks, strokes, heart failure, peripheral artery disease and shortened life expectancy. The risk for premature development of this disease is both genetic (family history) and environmental (diet, exercise, lifestyle, etc.).  Goals of your cardiovascular prevention screening include detecting the earliest signs of blood vessel or heart abnormalities, and identifying markers for risk that can be treated if identified early. Recommendations are included to improve health habits. In some cases medication may be recommended to help slow progression of disease. Our goal is to assist you in prevention of a heart attack, stroke and other cardiovascular diseases that are the major cause of illness and mortality in our society.    Your total cholesterol and LDL (bad cholesterol) results are in the  optimal  range. Your other cholesterol numbers are also at goal.  We recommend continuation of ongoing health habit modification (heart healthy nutrition, maintaining your weight within a normal weight range and an exercise routine) to maintain these levels.  An ideal weight range is a body mass index of 25 or less. Lipoprotein (a) level is 6 (normal range <29).    2. Your blood glucose (blood sugar) is higher than normal and may indicate insulin resistance (consistent glucose levels of 100-109 mg/dL).  Your body makes enough insulin, but the cells do not utilize it well.  Glucose levels vary from hour to hour depending on your weight and eating habits.  Maintaining a healthy weight is often effective in maintaining a normal blood glucose level and delaying or preventing the development of diabetes over time.  We recommend re-checking your blood  glucose level and a baseline HA1C with your primary care provider within 6-12 months.    3.  Your diet is heart healthy and well balanced.  We recommend increasing your intake of vegetables to 4-5 servings/day and increasing your fruit intake to 3-4 servings/day and incorporating healthy fats of the the Mediterranean diet into your diet. Eating salmon and using extra virgin olive oil are good examples. Nutrients found in fruits, vegetables and whole grains have been shown to be beneficial for the long-term health of your heart and blood vessels.     4.  Great job with your regular exercise regimen!  Regular exercise can help lower cholesterol, blood pressure, blood glucose and improve the health of your heart and blood vessels. The American Heart Association recommends 150 minutes of exercise per week, including strength (resistance training).  Always exercise within your comfort zone (no chest pain, able to talk comfortably).    5. We suggest that you consider incorporating 4-7-8 relaxation breathing, mindfulness stress reduction, meditation, yoga,and/or aromatherapy into your healthy lifestyle routine. All of these integrative therapies have been shown to be useful in reducing stress and promoting health. The website for the [facility] at the [facility] is [email or web address]. Taking Charge of Your Health and Wellbeing is a wonderful assessment tool to learn more about your wellbeing.    6. A coronary artery calcium scan is recommended to further assess your cardiovascular risk. This test may or may not be covered by insurance and costs approximately $150 ($160 with tax) and can be obtained at a variety of cardiovascular centers. If lung nodules are observed with this test follow up scans may be needed for further evaluation. An order for this diagnostic test has been placed in your electronic medical record.  Call [facility] at [phone] to schedule this  non-invasive diagnostic test.     7.  Return to clinic 5 years.     Thank you for choosing to participate in the prevention screening at [facility] CV Prevention clinic.  Cardiovascular prevention screening is important. Atherosclerosis may result in heart attacks, strokes, heart failure, peripheral artery disease.    Poppy Mcfarland, DNP, APRN, FNP-C

## 2025-07-08 ENCOUNTER — HOSPITAL ENCOUNTER (OUTPATIENT)
Dept: CT IMAGING | Facility: CLINIC | Age: 50
Discharge: HOME | End: 2025-07-08
Payer: COMMERCIAL

## 2025-07-08 DIAGNOSIS — E78.00 HIGH CHOLESTEROL: ICD-10-CM

## 2025-07-08 DIAGNOSIS — Z82.49 FAMILY HISTORY OF CORONARY ARTERY DISEASE: ICD-10-CM

## 2025-07-08 DIAGNOSIS — R03.0 ELEVATED BLOOD PRESSURE READING: ICD-10-CM

## 2025-07-08 DIAGNOSIS — E66.3 OVER WEIGHT: ICD-10-CM

## 2025-07-08 PROCEDURE — 75571 CT HRT W/O DYE W/CA TEST: CPT

## 2025-07-19 ENCOUNTER — HEALTH MAINTENANCE LETTER (OUTPATIENT)
Age: 50
End: 2025-07-19

## (undated) DEVICE — SPECIMEN CONTAINER 3OZ W/FORMALIN 59901

## (undated) DEVICE — CLIP HEMOCLIP ENDOSCOPIC INSTINCT 2.8X230CM INSC-7-230-SS

## (undated) DEVICE — KIT ENDO FIRST STEP DISINFECTANT 200ML W/POUCH EP-4

## (undated) DEVICE — ENDO FORCEP BX CAPTURA PRO SPIKE G50696

## (undated) DEVICE — TUBING SET THERMEDX UROLOGY SGL USE LL0006

## (undated) DEVICE — DRAPE C-ARM W/STRAPS 42X72" 07-CA104

## (undated) DEVICE — BASKET NITINOL TIPLESS HALO  1.5FRX120CM 554120

## (undated) DEVICE — SUCTION MANIFOLD NEPTUNE 2 SYS 1 PORT 702-025-000

## (undated) DEVICE — KIT ENDO TURNOVER/PROCEDURE CARRY-ON 101822

## (undated) DEVICE — GOWN IMPERVIOUS 2XL BLUE

## (undated) DEVICE — GLOVE PROTEXIS W/NEU-THERA 7.5  2D73TE75

## (undated) DEVICE — SOL NACL 0.9% IRRIG 3000ML BAG 2B7477

## (undated) DEVICE — RAD RX CONRAY 60% (50ML) CHARGE PER ML

## (undated) DEVICE — LASER FIBER FLEXIVA PULSE 242 M006L8405910

## (undated) DEVICE — SPECIMEN CONTAINER 5OZ STERILE 2600SA

## (undated) DEVICE — CATH URETERAL OPEN END 5FRX70CM M0064002010

## (undated) DEVICE — LINEN TOWEL PACK X5 5464

## (undated) DEVICE — PAD CHUX UNDERPAD 30X30"

## (undated) DEVICE — TUBING SUCTION 12"X1/4" N612

## (undated) DEVICE — GUIDEWIRE SENSOR DUAL FLEX STR 0.035"X150CM M0066703080

## (undated) DEVICE — PACK CYSTO CUSTOM ASC

## (undated) DEVICE — SOL WATER IRRIG 500ML BOTTLE 2F7113

## (undated) RX ORDER — CEFAZOLIN SODIUM 2 G/50ML
SOLUTION INTRAVENOUS
Status: DISPENSED
Start: 2022-01-19

## (undated) RX ORDER — ONDANSETRON 2 MG/ML
INJECTION INTRAMUSCULAR; INTRAVENOUS
Status: DISPENSED
Start: 2022-01-19

## (undated) RX ORDER — LIDOCAINE HYDROCHLORIDE 20 MG/ML
INJECTION, SOLUTION EPIDURAL; INFILTRATION; INTRACAUDAL; PERINEURAL
Status: DISPENSED
Start: 2022-01-19

## (undated) RX ORDER — DEXAMETHASONE SODIUM PHOSPHATE 4 MG/ML
INJECTION, SOLUTION INTRA-ARTICULAR; INTRALESIONAL; INTRAMUSCULAR; INTRAVENOUS; SOFT TISSUE
Status: DISPENSED
Start: 2022-01-19

## (undated) RX ORDER — FENTANYL CITRATE 50 UG/ML
INJECTION, SOLUTION INTRAMUSCULAR; INTRAVENOUS
Status: DISPENSED
Start: 2022-01-19

## (undated) RX ORDER — ACETAMINOPHEN 325 MG/1
TABLET ORAL
Status: DISPENSED
Start: 2022-01-19

## (undated) RX ORDER — GABAPENTIN 300 MG/1
CAPSULE ORAL
Status: DISPENSED
Start: 2022-01-19